# Patient Record
Sex: FEMALE | Race: WHITE | NOT HISPANIC OR LATINO | Employment: FULL TIME | URBAN - METROPOLITAN AREA
[De-identification: names, ages, dates, MRNs, and addresses within clinical notes are randomized per-mention and may not be internally consistent; named-entity substitution may affect disease eponyms.]

---

## 2017-05-25 ENCOUNTER — TELEPHONE (OUTPATIENT)
Dept: OBSTETRICS AND GYNECOLOGY | Facility: CLINIC | Age: 57
End: 2017-05-25

## 2017-05-25 DIAGNOSIS — N95.1 SYMPTOMATIC MENOPAUSAL OR FEMALE CLIMACTERIC STATES: Primary | ICD-10-CM

## 2017-05-25 RX ORDER — MEDROXYPROGESTERONE ACETATE 5 MG/1
5 TABLET ORAL DAILY
Qty: 30 TABLET | Refills: 3 | Status: SHIPPED | OUTPATIENT
Start: 2017-05-25 | End: 2017-06-23 | Stop reason: SDUPTHER

## 2017-05-25 NOTE — TELEPHONE ENCOUNTER
----- Message from Dyana Mathur sent at 5/24/2017 12:03 PM CDT -----  Contact: pt   Pt need refill on hormones pill.     ..[phone] (home) [phone] (work)      ..  [facility]/pharmacy #[id number] - [place] - [address] [address]  Phone: [phone] Fax: [phone]

## 2017-06-22 ENCOUNTER — TELEPHONE (OUTPATIENT)
Dept: OBSTETRICS AND GYNECOLOGY | Facility: CLINIC | Age: 57
End: 2017-06-22

## 2017-06-22 NOTE — TELEPHONE ENCOUNTER
----- Message from Ruby Montenegro sent at 6/22/2017  9:09 AM CDT -----  Contact: [facility]  She is checking the status on the refill on Evamist.  Call her at [phone].                                             collins

## 2017-06-23 ENCOUNTER — TELEPHONE (OUTPATIENT)
Dept: OBSTETRICS AND GYNECOLOGY | Facility: CLINIC | Age: 57
End: 2017-06-23

## 2017-06-23 DIAGNOSIS — N95.1 SYMPTOMATIC MENOPAUSAL OR FEMALE CLIMACTERIC STATES: Primary | ICD-10-CM

## 2017-06-23 RX ORDER — MEDROXYPROGESTERONE ACETATE 5 MG/1
5 TABLET ORAL DAILY
Qty: 30 TABLET | Refills: 0 | Status: SHIPPED | OUTPATIENT
Start: 2017-06-23 | End: 2017-07-23

## 2017-07-05 ENCOUNTER — OFFICE VISIT (OUTPATIENT)
Dept: OBSTETRICS AND GYNECOLOGY | Facility: CLINIC | Age: 57
End: 2017-07-05
Payer: COMMERCIAL

## 2017-07-05 VITALS
HEIGHT: 61 IN | BODY MASS INDEX: 39.84 KG/M2 | DIASTOLIC BLOOD PRESSURE: 84 MMHG | SYSTOLIC BLOOD PRESSURE: 116 MMHG | WEIGHT: 211 LBS

## 2017-07-05 DIAGNOSIS — Z12.4 SCREENING FOR CERVICAL CANCER: ICD-10-CM

## 2017-07-05 DIAGNOSIS — Z12.31 SCREENING MAMMOGRAM, ENCOUNTER FOR: ICD-10-CM

## 2017-07-05 DIAGNOSIS — Z01.419 ENCOUNTER FOR GYNECOLOGICAL EXAMINATION (GENERAL) (ROUTINE) WITHOUT ABNORMAL FINDINGS: Primary | ICD-10-CM

## 2017-07-05 DIAGNOSIS — N95.1 SYMPTOMATIC MENOPAUSAL OR FEMALE CLIMACTERIC STATES: ICD-10-CM

## 2017-07-05 PROCEDURE — 99999 PR PBB SHADOW E&M-EST. PATIENT-LVL III: CPT | Mod: PBBFAC,,, | Performed by: OBSTETRICS & GYNECOLOGY

## 2017-07-05 PROCEDURE — 99396 PREV VISIT EST AGE 40-64: CPT | Mod: S$GLB,,, | Performed by: OBSTETRICS & GYNECOLOGY

## 2017-07-05 PROCEDURE — 88175 CYTOPATH C/V AUTO FLUID REDO: CPT

## 2017-07-05 RX ORDER — MEDROXYPROGESTERONE ACETATE 5 MG/1
5 TABLET ORAL DAILY
Qty: 30 TABLET | Refills: 11 | Status: SHIPPED | OUTPATIENT
Start: 2017-07-05 | End: 2017-08-04

## 2017-07-05 NOTE — PROGRESS NOTES
Subjective:       Patient ID: Yolande Scherer is a 57 y.o. female.    Chief Complaint:  Well Woman      History of Present Illness  HPI  Annual Exam-Postmenopausal  Patient presents for annual exam. The patient has no complaints today. The patient is sexually active--but  with ED. GYN screening history: last pap: approximate date 2016 and was abnormal: +hpv and last mammogram: approximate date 2015 and was normal. The patient is taking hormone replacement therapy. Patient denies post-menopausal vaginal bleeding. The patient wears seatbelts: yes. The patient participates in regular exercise: no. Has the patient ever been transfused or tattooed?: no. The patient reports that there is not domestic violence in her life.      Denies hot flushes, night sweats; sleeping well--wishes to continue hrt (evamist spray and provera)  Reports occasional leak of urine with strong cough/sneeze           GYN & OB HistoryNo LMP recorded. Patient is postmenopausal.   Date of Last Pap: No result found    OB History   No data available       Review of Systems  Review of Systems   Constitutional: Negative for activity change, appetite change, chills, diaphoresis, fatigue, fever and unexpected weight change.   HENT: Negative for mouth sores and tinnitus.    Eyes: Negative for discharge and visual disturbance.   Respiratory: Negative for cough, shortness of breath and wheezing.    Cardiovascular: Negative for chest pain, palpitations and leg swelling.   Gastrointestinal: Negative for abdominal pain, bloating, blood in stool, constipation, diarrhea, nausea and vomiting.   Endocrine: Negative for diabetes, hair loss, hot flashes, hyperthyroidism and hypothyroidism.   Genitourinary: Negative for decreased libido, dyspareunia, dysuria, flank pain, frequency, genital sores, hematuria, menorrhagia, menstrual problem, pelvic pain, urgency, vaginal bleeding, vaginal discharge, vaginal pain, dysmenorrhea, urinary incontinence,  postcoital bleeding, postmenopausal bleeding and vaginal odor.   Musculoskeletal: Negative for back pain and myalgias.   Skin:  Negative for rash, no acne and hair changes.   Neurological: Negative for seizures, syncope, numbness and headaches.   Hematological: Negative for adenopathy. Does not bruise/bleed easily.   Psychiatric/Behavioral: Negative for depression and sleep disturbance. The patient is not nervous/anxious.    Breast: Negative for breast mass, breast pain, nipple discharge and skin changes          Objective:    Physical Exam:   Constitutional: She appears well-developed.     Eyes: Conjunctivae and EOM are normal. Pupils are equal, round, and reactive to light.    Neck: Normal range of motion. Neck supple.     Pulmonary/Chest: Effort normal. Right breast exhibits no mass, no nipple discharge, no skin change and no tenderness. Left breast exhibits no mass, no nipple discharge, no skin change and no tenderness. Breasts are symmetrical.        Abdominal: Soft.     Genitourinary: Rectum normal, vagina normal and uterus normal. Pelvic exam was performed with patient supine. Cervix is normal. Right adnexum displays no mass and no tenderness. Left adnexum displays no mass and no tenderness. No erythema, bleeding, rectocele, cystocele or unspecified prolapse of vaginal walls in the vagina. No vaginal discharge found. Labial bartholins normal.       Uterus Size: 6 cm   Musculoskeletal: Normal range of motion.       Neurological: She is alert.    Skin: Skin is warm.    Psychiatric: She has a normal mood and affect.          Assessment:        1. Encounter for gynecological examination (general) (routine) without abnormal findings    2. Screening for cervical cancer    3. Screening mammogram, encounter for               Plan:        Continue well woman  rx sent for evamist /provera  mammo ordered  Continue yearly pap due to hx +hpv  Current on colonoscopy  Continue diet, exercise, weight loss

## 2017-12-06 ENCOUNTER — OFFICE VISIT (OUTPATIENT)
Dept: OBSTETRICS AND GYNECOLOGY | Facility: CLINIC | Age: 57
End: 2017-12-06
Payer: COMMERCIAL

## 2017-12-06 VITALS
BODY MASS INDEX: 39.95 KG/M2 | WEIGHT: 211.63 LBS | DIASTOLIC BLOOD PRESSURE: 64 MMHG | SYSTOLIC BLOOD PRESSURE: 114 MMHG | HEIGHT: 61 IN

## 2017-12-06 DIAGNOSIS — N95.0 PMB (POSTMENOPAUSAL BLEEDING): Primary | ICD-10-CM

## 2017-12-06 PROCEDURE — 88305 TISSUE EXAM BY PATHOLOGIST: CPT | Performed by: PATHOLOGY

## 2017-12-06 PROCEDURE — 99999 PR PBB SHADOW E&M-EST. PATIENT-LVL III: CPT | Mod: PBBFAC,,, | Performed by: OBSTETRICS & GYNECOLOGY

## 2017-12-06 PROCEDURE — 88305 TISSUE EXAM BY PATHOLOGIST: CPT | Mod: 26,,, | Performed by: PATHOLOGY

## 2017-12-06 PROCEDURE — 58100 BIOPSY OF UTERUS LINING: CPT | Mod: S$GLB,,, | Performed by: OBSTETRICS & GYNECOLOGY

## 2017-12-06 PROCEDURE — 99499 UNLISTED E&M SERVICE: CPT | Mod: S$GLB,,, | Performed by: OBSTETRICS & GYNECOLOGY

## 2017-12-06 RX ORDER — MEDROXYPROGESTERONE ACETATE 5 MG/1
5 TABLET ORAL DAILY
Refills: 11 | COMMUNITY
Start: 2017-09-22 | End: 2018-04-05 | Stop reason: SDUPTHER

## 2017-12-06 RX ORDER — CYCLOBENZAPRINE HCL 10 MG
10 TABLET ORAL
COMMUNITY
Start: 2017-10-19 | End: 2018-07-26

## 2017-12-06 RX ORDER — TRAMADOL HYDROCHLORIDE 50 MG/1
50 TABLET ORAL
COMMUNITY
Start: 2017-10-19 | End: 2020-04-07 | Stop reason: ALTCHOICE

## 2017-12-06 RX ORDER — ESTRADIOL 1.53 MG/1
SPRAY TRANSDERMAL
Refills: 11 | COMMUNITY
Start: 2017-11-11 | End: 2018-04-05 | Stop reason: ALTCHOICE

## 2017-12-06 NOTE — PROCEDURES
Procedures   CC: ENDOMETRIAL BIOPSPY    Yolande Scherer is a 57 y.o. female No obstetric history on file. presents for an endometrial biopsy secondary to post menopausal bleeding on hrt.  UPT was not done     PRE-ENDOMETRIAL BIOPSY COUNSELING:    The patient was informed of the risk of bleeding, infection, uterine perforation and pain and that the test will rule-out endometrial cancer with accuracy greater than 95%.  She was counseled on the alternatives to endometrial biopsy and agrees to proceed.      TIME OUT PERFORMED.    The cervix was visualized with a speculum.  A single tooth tenaculum was placed on the anterior lip prior to the biopsy.      A sterile endometrial pipelle was passed without difficulty to a depth of 9 cm.    Endometrial tissue was obtained.      The specimen was placed in formalyn and sent to Pathology of histology evaluation.    The patient tolerated the procedure well.      ASSESSMENT AND PLAN  1. PMB (postmenopausal bleeding)  Tissue Specimen To Pathology, Obstetrics/Gynecology       POST ENDOMETRIAL BIOPSY COUNSELING:  Manage post biopsy cramping with NSAIDs or Tylenol.  Expect spotting or light bleeding for a few days.  Report bleeding heavier than a period, fever > 101.0 F, worsening pain or a foul smelling vaginal discharge.      Counseling lasted approximately 15 minutes and all her questions were answered.      FOLLOW-UP:  Pending biopsy

## 2017-12-06 NOTE — PROGRESS NOTES
Subjective:       Patient ID: Yolande Scherer is a 57 y.o. female.    Chief Complaint:  Vaginal Bleeding (irregular vag bleeding x's 2 weeks)      History of Present Illness  HPI  Postmenopausal Bleeding  Patient complains of vaginal bleeding. She has been menopausal for several years. Currently on continuous HRT and has been on this regimen for several years (elsa mist --3 sprays and provera 5mg) . Bleeding is described as spotting and has occurred several times. Notices that underwear is stained and dark red brown color when she wipes; also c/o lower pelvic pain and vaginal soreness; no recent intercourse;   Denies any missed pills of provera  Menstrual History:  OB History     No data available         Menarche age:   No LMP recorded. Patient is postmenopausal.           GYN & OB HistoryNo LMP recorded. Patient is postmenopausal.   Date of Last Pap: 7/10/2017    OB History   No data available       Review of Systems  Review of Systems   Constitutional: Negative for activity change, appetite change, chills, diaphoresis, fatigue, fever and unexpected weight change.   HENT: Negative for mouth sores and tinnitus.    Eyes: Negative for discharge and visual disturbance.   Respiratory: Negative for cough, shortness of breath and wheezing.    Cardiovascular: Negative for chest pain, palpitations and leg swelling.   Gastrointestinal: Negative for abdominal pain, bloating, blood in stool, constipation, diarrhea, nausea and vomiting.   Endocrine: Negative for diabetes, hair loss, hot flashes, hyperthyroidism and hypothyroidism.   Genitourinary: Positive for postmenopausal bleeding. Negative for decreased libido, dyspareunia, dysuria, flank pain, frequency, genital sores, hematuria, menorrhagia, menstrual problem, pelvic pain, urgency, vaginal bleeding, vaginal discharge, vaginal pain, dysmenorrhea, urinary incontinence, postcoital bleeding and vaginal odor.   Musculoskeletal: Negative for back pain and myalgias.    Skin:  Negative for rash, no acne and hair changes.   Neurological: Negative for seizures, syncope, numbness and headaches.   Hematological: Negative for adenopathy. Does not bruise/bleed easily.   Psychiatric/Behavioral: Negative for depression and sleep disturbance. The patient is not nervous/anxious.    Breast: Negative for breast mass, breast pain, nipple discharge and skin changes          Objective:    Physical Exam:   Constitutional: She appears well-developed.     Eyes: Conjunctivae and EOM are normal. Pupils are equal, round, and reactive to light.    Neck: Normal range of motion. Neck supple.     Pulmonary/Chest: Effort normal. Right breast exhibits no mass, no nipple discharge, no skin change, no tenderness and presence. Left breast exhibits no mass, no nipple discharge, no skin change, no tenderness and presence. Breasts are symmetrical.        Abdominal: Soft.     Genitourinary: Vagina normal and uterus normal. Pelvic exam was performed with patient supine.           Musculoskeletal: Normal range of motion.       Neurological: She is alert.    Skin: Skin is warm.    Psychiatric: She has a normal mood and affect.          Assessment:     Encounter Diagnosis   Name Primary?    PMB (postmenopausal bleeding) Yes                 Plan:      Pt advised needs emb  Agrees to proceed  See procedure note for details

## 2017-12-11 ENCOUNTER — TELEPHONE (OUTPATIENT)
Dept: OBSTETRICS AND GYNECOLOGY | Facility: CLINIC | Age: 57
End: 2017-12-11

## 2017-12-11 NOTE — TELEPHONE ENCOUNTER
Left messages for the pt. to call back. benjamin flynn    Please advise the emb was negative--no sign of abnormal tissue (hyperplasia or cancer); continue to use  evamist and provera daily; please continue to watch for bleeding; if bleeding reoccurs, will need hysteroscopy (procedure where I look inside uterus--out pt procedure)

## 2017-12-12 ENCOUNTER — TELEPHONE (OUTPATIENT)
Dept: OBSTETRICS AND GYNECOLOGY | Facility: CLINIC | Age: 57
End: 2017-12-12

## 2017-12-12 DIAGNOSIS — N95.0 POST-MENOPAUSAL BLEEDING: Primary | ICD-10-CM

## 2017-12-12 NOTE — TELEPHONE ENCOUNTER
----- Message from Maeve Pantoja sent at 12/12/2017 12:14 PM CST -----  Contact: self   Patient returning call. Please call back at [phone] or [phone] please let know it is the doctors office when calling work phone.      Thanks,  Maeve Pantoja

## 2017-12-12 NOTE — TELEPHONE ENCOUNTER
Bleeding has not stopped and it still comes and goes. Pt wishes to proceed to next step if possible. DS

## 2017-12-12 NOTE — TELEPHONE ENCOUNTER
----- Message from Bridget Burgos sent at 12/12/2017  8:17 AM CST -----  Contact: Pt   Pt called and stated she needed to speak to the nurse. She stated that she is returning a call to the nurse regarding her biopsy results. She can be reached at [phone] (home) [phone] (work).    Thanks,  TF

## 2018-01-18 ENCOUNTER — TELEPHONE (OUTPATIENT)
Dept: OBSTETRICS AND GYNECOLOGY | Facility: CLINIC | Age: 58
End: 2018-01-18

## 2018-01-18 NOTE — TELEPHONE ENCOUNTER
----- Message from Noemi Collins sent at 1/18/2018  2:36 PM CST -----  Contact: pt   x 1st Request  _ 2nd Request  _ 3rd Request    Who: Pt     Why: Pt is calling to reschedule Pre-op appt. First available was in March. Please call and advise.     What Number to Call Back:[phone]     When to Expect a call back: (Before the end of the day)  -- if call after 3:00 call back will be tomorrow.

## 2018-01-19 ENCOUNTER — TELEPHONE (OUTPATIENT)
Dept: OBSTETRICS AND GYNECOLOGY | Facility: CLINIC | Age: 58
End: 2018-01-19

## 2018-01-19 NOTE — TELEPHONE ENCOUNTER
"Pt has a liability and/or residual balance due.            Is this procedure medically urgent or non-medically?   Please respond via In-basket or contact [facility] @ [phone]                   Medical Urgency Definition( below)   If you can answer Yes to one of the following questions, the    Case will be considered "Medically Urgent" and will be done as   Scheduled irrespective of whether [facility] will receive payment.      *If this particular case is not done as scheduled, would the patient   Experience loss of life?   *Loss of Limb?   *Irreversible loss of function?   *Would their condition significantly worsen?         If none of these questions have a yes answer, the case   Should be postponed until such a time as the finances   can be sorted out.       Thanks April       please advise .....noe   "

## 2018-01-19 NOTE — TELEPHONE ENCOUNTER
"Tried calling the financial counselor several times to inform them that the pt.'s procedure is not medically necessary per Dr. Ward. benjamin Paulino    Pt has a liability and/or residual balance due.            Is this procedure medically urgent or non-medically?   Please respond via In-basket or contact [facility] @ [phone]                     Medical Urgency Definition( below)   If you can answer Yes to one of the following questions, the    Case will be considered "Medically Urgent" and will be done as   Scheduled irrespective of whether [facility] will receive payment.       *If this particular case is not done as scheduled, would the patient   Experience loss of life?   *Loss of Limb?   *Irreversible loss of function?   *Would their condition significantly worsen?           If none of these questions have a yes answer, the case   Should be postponed until such a time as the finances   can be sorted out.        Thanks April        "

## 2018-01-22 ENCOUNTER — LAB VISIT (OUTPATIENT)
Dept: LAB | Facility: HOSPITAL | Age: 58
End: 2018-01-22
Attending: OBSTETRICS & GYNECOLOGY
Payer: COMMERCIAL

## 2018-01-22 ENCOUNTER — OFFICE VISIT (OUTPATIENT)
Dept: OBSTETRICS AND GYNECOLOGY | Facility: CLINIC | Age: 58
End: 2018-01-22
Payer: COMMERCIAL

## 2018-01-22 VITALS
BODY MASS INDEX: 38.75 KG/M2 | HEIGHT: 61 IN | SYSTOLIC BLOOD PRESSURE: 118 MMHG | WEIGHT: 205.25 LBS | DIASTOLIC BLOOD PRESSURE: 74 MMHG

## 2018-01-22 DIAGNOSIS — N95.0 POST-MENOPAUSAL BLEEDING: Primary | ICD-10-CM

## 2018-01-22 DIAGNOSIS — N95.0 POST-MENOPAUSAL BLEEDING: ICD-10-CM

## 2018-01-22 LAB
BASOPHILS # BLD AUTO: 0.03 K/UL
BASOPHILS NFR BLD: 0.4 %
DIFFERENTIAL METHOD: NORMAL
EOSINOPHIL # BLD AUTO: 0.1 K/UL
EOSINOPHIL NFR BLD: 1.5 %
ERYTHROCYTE [DISTWIDTH] IN BLOOD BY AUTOMATED COUNT: 14 %
HCT VFR BLD AUTO: 43.9 %
HGB BLD-MCNC: 14.1 G/DL
IMM GRANULOCYTES # BLD AUTO: 0.03 K/UL
IMM GRANULOCYTES NFR BLD AUTO: 0.4 %
LYMPHOCYTES # BLD AUTO: 2.2 K/UL
LYMPHOCYTES NFR BLD: 26.9 %
MCH RBC QN AUTO: 29 PG
MCHC RBC AUTO-ENTMCNC: 32.1 G/DL
MCV RBC AUTO: 90 FL
MONOCYTES # BLD AUTO: 0.4 K/UL
MONOCYTES NFR BLD: 4.7 %
NEUTROPHILS # BLD AUTO: 5.4 K/UL
NEUTROPHILS NFR BLD: 66.1 %
NRBC BLD-RTO: 0 /100 WBC
PLATELET # BLD AUTO: 295 K/UL
PMV BLD AUTO: 10.4 FL
RBC # BLD AUTO: 4.86 M/UL
WBC # BLD AUTO: 8.15 K/UL

## 2018-01-22 PROCEDURE — 99499 UNLISTED E&M SERVICE: CPT | Mod: S$GLB,,, | Performed by: OBSTETRICS & GYNECOLOGY

## 2018-01-22 PROCEDURE — 85025 COMPLETE CBC W/AUTO DIFF WBC: CPT

## 2018-01-22 PROCEDURE — 99999 PR PBB SHADOW E&M-EST. PATIENT-LVL III: CPT | Mod: PBBFAC,,, | Performed by: OBSTETRICS & GYNECOLOGY

## 2018-01-22 PROCEDURE — 80048 BASIC METABOLIC PNL TOTAL CA: CPT

## 2018-01-22 PROCEDURE — 36415 COLL VENOUS BLD VENIPUNCTURE: CPT | Mod: PO

## 2018-01-22 PROCEDURE — 84702 CHORIONIC GONADOTROPIN TEST: CPT

## 2018-01-22 NOTE — PRE-PROCEDURE INSTRUCTIONS
Pre op instructions reviewed with patient per phone:    To confirm, Your surgeon has instructed you:  Surgery is scheduled 1/25/18 at 1245.      Please report to [facility] 1st floor main lobby by 1115  Pre admit office will call afternoon prior to surgery for final arrival time.      INSTRUCTIONS IMPORTANT!!!  ¨ No smoking after 12 midnight, the night before surgery.  ¨ No solid food after 12 midnight, but you may have clear liquids up until 3 hours prior to surgery.  This includes: grape, cranberry, and apple juice (not orange, and no coffee.)   ¨ OK to brush teeth, but no gum, candy or mints!    ¨ Take only these medicines with a small swallow of water-morning of surgery.  Omeprazole, Sodium Bicarb    ____  Do not wear makeup, including mascara.  ____  No powder, lotions or creams to surgical area.  ____  Please remove all jewelry, including piercings and leave at home.  ____  No money or valuables needed. Please leave at home.  ____  Please bring identification and insurance information to hospital.  ____  If going home the same day, arrange for a ride home. You will not be able to   drive if Anesthesia was used.  ____  Children, under 12 years old, must remain in the waiting room with an adult.  They are not allowed in patient areas.  ____  Wear loose fitting clothing. Allow for dressings, bandages.  ____  Stop Aspirin, Ibuprofen, Motrin and Aleve at least 5-7 days before surgery, unless otherwise instructed by your doctor, or the nurse.   You MAY use Tylenol/acetaminophen until day of surgery.  ____  If you take diabetic medication, do not take am of surgery unless instructed by   Doctor.  ____ Stop taking any Fish Oil supplement or any Vitamins that contain Vitamin E at least 5 days prior to surgery.          Bathing Instructions-- The night before surgery and the morning prior to coming to the hospital:   -Do not shave the surgical area.   -Shower and wash your hair and body as usual  with your regular soap and shampoo.   -Rinse your hair and body completely.   -Use one packet of hibiclens to wash the surgical site (using your hand) gently for 5 minutes.  Do not scrub you skin too hard.   -Do not use hibiclens on your head, face, or genitals.   -Do not wash with regular soap after you use the hibiclens.   -Rinse your body thoroughly.   -Dry with clean, soft towel.  Do not use lotion, cream, deodorant, or powders on   the surgical site.    Use antibacterial soap in place of hibiclens if your surgery is on the head, face or genitals.         Surgical Site Infection    Prevention of surgical site infections:     -Keep incisions clean and dry.   -Do not soak/submerge incisions in water until completely healed.   -Do not apply lotions, powders, creams, or deodorants to site.   -Always make sure hands are cleaned with antibacterial soap/ alcohol-based   prior to touching the surgical site.  (This includes doctors, nurses, staff, and yourself.)    Signs and symptoms:   -Redness and pain around the area where you had surgery   -Drainage of cloudy fluid from your surgical wound   -Fever over 100.4  I have read or had read and explained to me, and understand the above information.

## 2018-01-22 NOTE — PROGRESS NOTES
History & Physical    SUBJECTIVE:     History of Present Illness:  Patient is a 57 y.o. female presents with post menopausal bleeding; using usual dose of evamist spray (3 sprays) with daily provera; emb 12/2017--shows benign inactive endometrium with no signs of hyperplasia.  Onset of symptoms was 12/2017; has not had further bleeding since late December;  Patient denies uterine cramping. Bleeding is unpredictable    Chief Complaint   Patient presents with    Pre-op Exam       Review of patient's allergies indicates:  No Known Allergies    Current Outpatient Prescriptions   Medication Sig Dispense Refill    cholecalciferol, vitamin D3, 1,000 unit capsule Take 1,000 Units by mouth once daily.      EVAMIST 1.53 mg/spray (1.7%) transdermal spray PLACE 3 SPRAYS ONTO THE SKIN ONCE DAILY.  11    medroxyPROGESTERone (PROVERA) 5 MG tablet Take 5 mg by mouth once daily.  11    omeprazole (PRILOSEC) 40 MG capsule       omeprazole-sodium bicarbonate (ZEGERID) 40-1.1 mg-gram per capsule       cyclobenzaprine (FLEXERIL) 10 MG tablet Take 10 mg by mouth.      traMADol (ULTRAM) 50 mg tablet Take 50 mg by mouth.       No current facility-administered medications for this visit.        Past Medical History:   Diagnosis Date    Dry eyes 12/9/2013    Dry mouth 1/17/2014    Fibromyalgia 12/9/2013    History of vitamin D deficiency     Liver cyst     NAFLD (nonalcoholic fatty liver disease)      Past Surgical History:   Procedure Laterality Date    CHOLECYSTECTOMY      COLONOSCOPY      TONSILLECTOMY      WISDOM TOOTH EXTRACTION       Family History   Problem Relation Age of Onset    Cataracts Mother     Macular degeneration Father     Cataracts Father     Stroke Father     Liver disease Neg Hx      Social History   Substance Use Topics    Smoking status: Never Smoker    Smokeless tobacco: Never Used    Alcohol use No        Review of Systems:  Review of Systems   Genitourinary: Positive for menstrual problem.  "      OBJECTIVE:     Vital Signs (Most Recent)  BP: 118/74 (01/22/18 0948)  5' 1" (1.549 m)  93.1 kg (205 lb 4 oz)     Physical Exam:  Physical Exam   Constitutional: She is oriented to person, place, and time. She appears well-developed.   HENT:   Head: Normocephalic.   Eyes: Conjunctivae and EOM are normal. Pupils are equal, round, and reactive to light.   Neck: Normal range of motion.   Cardiovascular: Normal rate.    Pulmonary/Chest: Effort normal and breath sounds normal.   Abdominal: Soft.   Musculoskeletal: Normal range of motion.   Neurological: She is alert and oriented to person, place, and time.   Skin: Skin is warm and dry.   Psychiatric: She has a normal mood and affect. Her behavior is normal. Judgment and thought content normal.   Vitals reviewed.      Laboratory  Cbc,cmp, hcg today    Diagnostic Results:  n/a    ASSESSMENT/PLAN:     Encounter Diagnosis   Name Primary?    Post-menopausal bleeding Yes         PLAN:Plan     Reviewed dilation and curettage with hysteroscopy and  novasure ablation procedure in detail.  Reviewed risks including but not limited to infection, bleeding, damage to bowel/bladder, cva;htn.     All questions answered to the best of my ability.  Alternatives reviewed --continued observation, increase dose of provera; stop hrt  Pt wishes to proceed with dilation and curettage and hysteroscopy with ablation.  Consents signed witnessed.  Pt aware if continues to have bleeding after novasure; may need hysterectomy           "

## 2018-01-23 LAB
ANION GAP SERPL CALC-SCNC: 10 MMOL/L
BUN SERPL-MCNC: 11 MG/DL
CALCIUM SERPL-MCNC: 9.2 MG/DL
CHLORIDE SERPL-SCNC: 107 MMOL/L
CO2 SERPL-SCNC: 24 MMOL/L
CREAT SERPL-MCNC: 0.7 MG/DL
EST. GFR  (AFRICAN AMERICAN): >60 ML/MIN/1.73 M^2
EST. GFR  (NON AFRICAN AMERICAN): >60 ML/MIN/1.73 M^2
GLUCOSE SERPL-MCNC: 73 MG/DL
HCG INTACT+B SERPL-ACNC: <1.2 MIU/ML
POTASSIUM SERPL-SCNC: 4 MMOL/L
SODIUM SERPL-SCNC: 141 MMOL/L

## 2018-01-24 ENCOUNTER — ANESTHESIA EVENT (OUTPATIENT)
Dept: SURGERY | Facility: HOSPITAL | Age: 58
End: 2018-01-24
Payer: COMMERCIAL

## 2018-01-25 ENCOUNTER — SURGERY (OUTPATIENT)
Age: 58
End: 2018-01-25

## 2018-01-25 ENCOUNTER — HOSPITAL ENCOUNTER (OUTPATIENT)
Facility: HOSPITAL | Age: 58
Discharge: HOME | End: 2018-01-25
Attending: OBSTETRICS & GYNECOLOGY | Admitting: OBSTETRICS & GYNECOLOGY
Payer: COMMERCIAL

## 2018-01-25 ENCOUNTER — ANESTHESIA (OUTPATIENT)
Dept: SURGERY | Facility: HOSPITAL | Age: 58
End: 2018-01-25
Payer: COMMERCIAL

## 2018-01-25 ENCOUNTER — TELEPHONE (OUTPATIENT)
Dept: OBSTETRICS AND GYNECOLOGY | Facility: CLINIC | Age: 58
End: 2018-01-25

## 2018-01-25 DIAGNOSIS — N95.0 POST-MENOPAUSAL BLEEDING: ICD-10-CM

## 2018-01-25 PROCEDURE — 88305 TISSUE EXAM BY PATHOLOGIST: CPT | Performed by: PATHOLOGY

## 2018-01-25 PROCEDURE — 71000015 HC POSTOP RECOV 1ST HR: Performed by: OBSTETRICS & GYNECOLOGY

## 2018-01-25 PROCEDURE — 63600175 PHARM REV CODE 636 W HCPCS: Performed by: CLINIC/CENTER

## 2018-01-25 PROCEDURE — 25000003 PHARM REV CODE 250: Performed by: ANESTHESIOLOGY

## 2018-01-25 PROCEDURE — 27201423 OPTIME MED/SURG SUP & DEVICES STERILE SUPPLY: Performed by: OBSTETRICS & GYNECOLOGY

## 2018-01-25 PROCEDURE — 88305 TISSUE EXAM BY PATHOLOGIST: CPT | Mod: 26,,, | Performed by: PATHOLOGY

## 2018-01-25 PROCEDURE — 36000707: Performed by: OBSTETRICS & GYNECOLOGY

## 2018-01-25 PROCEDURE — 63600175 PHARM REV CODE 636 W HCPCS: Performed by: ANESTHESIOLOGY

## 2018-01-25 PROCEDURE — 37000009 HC ANESTHESIA EA ADD 15 MINS: Performed by: OBSTETRICS & GYNECOLOGY

## 2018-01-25 PROCEDURE — 37000008 HC ANESTHESIA 1ST 15 MINUTES: Performed by: OBSTETRICS & GYNECOLOGY

## 2018-01-25 PROCEDURE — 71000039 HC RECOVERY, EACH ADD'L HOUR: Performed by: OBSTETRICS & GYNECOLOGY

## 2018-01-25 PROCEDURE — 36000706: Performed by: OBSTETRICS & GYNECOLOGY

## 2018-01-25 PROCEDURE — 71000033 HC RECOVERY, INTIAL HOUR: Performed by: OBSTETRICS & GYNECOLOGY

## 2018-01-25 PROCEDURE — 25000003 PHARM REV CODE 250: Performed by: CLINIC/CENTER

## 2018-01-25 PROCEDURE — 58563 HYSTEROSCOPY ABLATION: CPT | Mod: ,,, | Performed by: OBSTETRICS & GYNECOLOGY

## 2018-01-25 RX ORDER — IBUPROFEN 800 MG/1
800 TABLET ORAL 3 TIMES DAILY
Qty: 15 TABLET | Refills: 0 | Status: SHIPPED | OUTPATIENT
Start: 2018-01-25 | End: 2018-01-30

## 2018-01-25 RX ORDER — DIPHENHYDRAMINE HYDROCHLORIDE 50 MG/ML
25 INJECTION INTRAMUSCULAR; INTRAVENOUS EVERY 4 HOURS PRN
Status: DISCONTINUED | OUTPATIENT
Start: 2018-01-25 | End: 2018-01-25 | Stop reason: HOSPADM

## 2018-01-25 RX ORDER — ROCURONIUM BROMIDE 10 MG/ML
INJECTION, SOLUTION INTRAVENOUS
Status: DISCONTINUED | OUTPATIENT
Start: 2018-01-25 | End: 2018-01-25

## 2018-01-25 RX ORDER — SUCCINYLCHOLINE CHLORIDE 20 MG/ML
INJECTION INTRAMUSCULAR; INTRAVENOUS
Status: DISCONTINUED | OUTPATIENT
Start: 2018-01-25 | End: 2018-01-25

## 2018-01-25 RX ORDER — FENTANYL CITRATE 50 UG/ML
25 INJECTION, SOLUTION INTRAMUSCULAR; INTRAVENOUS EVERY 5 MIN PRN
Status: DISCONTINUED | OUTPATIENT
Start: 2018-01-25 | End: 2018-01-25 | Stop reason: HOSPADM

## 2018-01-25 RX ORDER — ONDANSETRON 8 MG/1
8 TABLET, ORALLY DISINTEGRATING ORAL EVERY 8 HOURS PRN
Status: DISCONTINUED | OUTPATIENT
Start: 2018-01-25 | End: 2018-01-25 | Stop reason: HOSPADM

## 2018-01-25 RX ORDER — MEPERIDINE HYDROCHLORIDE 50 MG/ML
12.5 INJECTION INTRAMUSCULAR; INTRAVENOUS; SUBCUTANEOUS ONCE AS NEEDED
Status: COMPLETED | OUTPATIENT
Start: 2018-01-25 | End: 2018-01-25

## 2018-01-25 RX ORDER — SODIUM CHLORIDE 0.9 % (FLUSH) 0.9 %
3 SYRINGE (ML) INJECTION
Status: DISCONTINUED | OUTPATIENT
Start: 2018-01-25 | End: 2018-01-25 | Stop reason: HOSPADM

## 2018-01-25 RX ORDER — PROPOFOL 10 MG/ML
VIAL (ML) INTRAVENOUS
Status: DISCONTINUED | OUTPATIENT
Start: 2018-01-25 | End: 2018-01-25

## 2018-01-25 RX ORDER — SODIUM CHLORIDE 0.9 % (FLUSH) 0.9 %
3 SYRINGE (ML) INJECTION EVERY 8 HOURS
Status: DISCONTINUED | OUTPATIENT
Start: 2018-01-25 | End: 2018-01-25 | Stop reason: HOSPADM

## 2018-01-25 RX ORDER — HYDROCODONE BITARTRATE AND ACETAMINOPHEN 5; 325 MG/1; MG/1
1 TABLET ORAL EVERY 4 HOURS PRN
Status: DISCONTINUED | OUTPATIENT
Start: 2018-01-25 | End: 2018-01-25 | Stop reason: HOSPADM

## 2018-01-25 RX ORDER — LIDOCAINE HYDROCHLORIDE 10 MG/ML
INJECTION INFILTRATION; PERINEURAL
Status: DISCONTINUED | OUTPATIENT
Start: 2018-01-25 | End: 2018-01-25

## 2018-01-25 RX ORDER — ONDANSETRON 2 MG/ML
INJECTION INTRAMUSCULAR; INTRAVENOUS
Status: DISCONTINUED | OUTPATIENT
Start: 2018-01-25 | End: 2018-01-25

## 2018-01-25 RX ORDER — DIPHENHYDRAMINE HCL 25 MG
25 CAPSULE ORAL EVERY 4 HOURS PRN
Status: DISCONTINUED | OUTPATIENT
Start: 2018-01-25 | End: 2018-01-25 | Stop reason: HOSPADM

## 2018-01-25 RX ORDER — FENTANYL CITRATE 50 UG/ML
INJECTION, SOLUTION INTRAMUSCULAR; INTRAVENOUS
Status: DISCONTINUED | OUTPATIENT
Start: 2018-01-25 | End: 2018-01-25

## 2018-01-25 RX ORDER — METOCLOPRAMIDE HYDROCHLORIDE 5 MG/ML
10 INJECTION INTRAMUSCULAR; INTRAVENOUS EVERY 10 MIN PRN
Status: DISCONTINUED | OUTPATIENT
Start: 2018-01-25 | End: 2018-01-25 | Stop reason: HOSPADM

## 2018-01-25 RX ORDER — HYDROCODONE BITARTRATE AND ACETAMINOPHEN 5; 325 MG/1; MG/1
1 TABLET ORAL EVERY 6 HOURS PRN
Qty: 8 TABLET | Refills: 0 | Status: SHIPPED | OUTPATIENT
Start: 2018-01-25 | End: 2018-04-05 | Stop reason: ALTCHOICE

## 2018-01-25 RX ORDER — OXYCODONE HYDROCHLORIDE 5 MG/1
5 TABLET ORAL
Status: DISCONTINUED | OUTPATIENT
Start: 2018-01-25 | End: 2018-01-25 | Stop reason: HOSPADM

## 2018-01-25 RX ORDER — MIDAZOLAM HYDROCHLORIDE 1 MG/ML
INJECTION, SOLUTION INTRAMUSCULAR; INTRAVENOUS
Status: DISCONTINUED | OUTPATIENT
Start: 2018-01-25 | End: 2018-01-25

## 2018-01-25 RX ORDER — SODIUM CHLORIDE, SODIUM LACTATE, POTASSIUM CHLORIDE, CALCIUM CHLORIDE 600; 310; 30; 20 MG/100ML; MG/100ML; MG/100ML; MG/100ML
INJECTION, SOLUTION INTRAVENOUS CONTINUOUS
Status: DISCONTINUED | OUTPATIENT
Start: 2018-01-25 | End: 2018-01-25 | Stop reason: HOSPADM

## 2018-01-25 RX ORDER — OXYCODONE HYDROCHLORIDE 5 MG/1
5 TABLET ORAL ONCE AS NEEDED
Status: COMPLETED | OUTPATIENT
Start: 2018-01-25 | End: 2018-01-25

## 2018-01-25 RX ORDER — GLYCOPYRROLATE 0.2 MG/ML
INJECTION INTRAMUSCULAR; INTRAVENOUS
Status: DISCONTINUED | OUTPATIENT
Start: 2018-01-25 | End: 2018-01-25

## 2018-01-25 RX ORDER — ACETAMINOPHEN 10 MG/ML
1000 INJECTION, SOLUTION INTRAVENOUS ONCE
Status: COMPLETED | OUTPATIENT
Start: 2018-01-25 | End: 2018-01-25

## 2018-01-25 RX ADMIN — FENTANYL CITRATE 100 MCG: 50 INJECTION, SOLUTION INTRAMUSCULAR; INTRAVENOUS at 07:01

## 2018-01-25 RX ADMIN — SODIUM CHLORIDE, SODIUM LACTATE, POTASSIUM CHLORIDE, AND CALCIUM CHLORIDE: 600; 310; 30; 20 INJECTION, SOLUTION INTRAVENOUS at 06:01

## 2018-01-25 RX ADMIN — PROPOFOL 150 MG: 10 INJECTION, EMULSION INTRAVENOUS at 07:01

## 2018-01-25 RX ADMIN — LIDOCAINE HYDROCHLORIDE 60 MG: 10 INJECTION, SOLUTION INFILTRATION; PERINEURAL at 07:01

## 2018-01-25 RX ADMIN — MIDAZOLAM HYDROCHLORIDE 2 MG: 1 INJECTION, SOLUTION INTRAMUSCULAR; INTRAVENOUS at 06:01

## 2018-01-25 RX ADMIN — FENTANYL CITRATE 25 MCG: 50 INJECTION, SOLUTION INTRAMUSCULAR; INTRAVENOUS at 09:01

## 2018-01-25 RX ADMIN — OXYCODONE HYDROCHLORIDE 5 MG: 5 TABLET ORAL at 09:01

## 2018-01-25 RX ADMIN — ROBINUL 0.2 MG: 0.2 INJECTION INTRAMUSCULAR; INTRAVENOUS at 07:01

## 2018-01-25 RX ADMIN — ONDANSETRON 4 MG: 2 INJECTION, SOLUTION INTRAMUSCULAR; INTRAVENOUS at 07:01

## 2018-01-25 RX ADMIN — OXYCODONE HYDROCHLORIDE 5 MG: 5 TABLET ORAL at 08:01

## 2018-01-25 RX ADMIN — MEPERIDINE HYDROCHLORIDE 12.5 MG: 50 INJECTION INTRAMUSCULAR; INTRAVENOUS; SUBCUTANEOUS at 08:01

## 2018-01-25 RX ADMIN — ROCURONIUM BROMIDE 5 MG: 10 INJECTION, SOLUTION INTRAVENOUS at 07:01

## 2018-01-25 RX ADMIN — ACETAMINOPHEN 1000 MG: 10 INJECTION, SOLUTION INTRAVENOUS at 08:01

## 2018-01-25 RX ADMIN — SUCCINYLCHOLINE CHLORIDE 100 MG: 20 INJECTION, SOLUTION INTRAMUSCULAR; INTRAVENOUS at 07:01

## 2018-01-25 NOTE — TELEPHONE ENCOUNTER
----- Message from Windy Arriaga sent at 1/25/2018  8:36 AM CST -----  Contact: Discharge nurse  She's calling to see if the pt can be fit into schedule for [facility], please advise [phone] (home) [phone] (work)

## 2018-01-25 NOTE — DISCHARGE INSTRUCTIONS
General Information:    1. Do not drink alcoholic beverages including beer for 24 hours or as long as you are on pain medication..  2. Do not drive a motor vehicle, operate machinery or power tools, or signs legal papers for 24 hours or as long as you are on pain medication.   3. You may experience light-headedness, dizziness, and sleepiness following surgery. Please do not stay alone. A responsible adult should be with you for this 24 hour period.  4. Go home and rest.  5. Progress slowly to a normal diet unless instructed.  Otherwise, begin with liquids such as soft drinks, then soup and crackers working up to solid foods. Drink plenty of nonalcoholic fluids.  6. Certain anesthetics and pain medications produce nausea and vomiting in certain individuals. If nausea becomes a problem at home, call you doctor.  7. A nurse will be calling you sometime after surgery. Do not be alarmed. This is our way of finding out how you are doing.  8. Several times every hour while you are awake, take 2-3 deep breaths and cough. If you had stomach surgery hold a pillow or rolled towel firmly against your stomach before you cough. This will help with any pain the cough might cause.  9. Several times every hour while you are awake, pump and flex your feet 5-6 times and do foot circles. This will help prevent blood clots.  10. Call your doctor for severe pain, bleeding, fever, or signs or symptoms of infection (pain, swelling, redness, foul odor, drainage).  11. You can contact your doctor anytime by [phone] for the [facility] (at [facility]) or [phone] for the [facility] on [facility].   [email or web address] is another way to contact your doctor if you are an active participant online with [facility].  12. Continue Nozin provided at discharge twice daily for 7 days or until the incision is healed.  See pamphlet or box for instructions.

## 2018-01-25 NOTE — INTERVAL H&P NOTE
The patient has been examined and the H&P has been reviewed:    I concur with the findings and no changes have occurred since H&P was written.--she is ready to proceed with hysteroscopy with d&c and novasure ablation; She has no further questions    Anesthesia/Surgery risks, benefits and alternative options discussed and understood by patient/family.          There are no hospital problems to display for this patient.

## 2018-01-25 NOTE — OP NOTE
[facility] -   General Surgery  Operative Note    SUMMARY     Date of Procedure: 1/25/2018     Procedure: Procedure(s) (LRB):  D&C HYSTEROSCOPY ABLATION ENDOMETRIAL NOVASURE (N/A)       Surgeon(s) and Role:     * Ivet Ward MD - Primary    Assisting Surgeon: None    Pre-Operative Diagnosis: Post-menopausal bleeding [N95.0]    Post-Operative Diagnosis: Post-Op Diagnosis Codes:     * Post-menopausal bleeding [N95.0]    Anesthesia: General    Technical Procedures Used: hysteroscopy with dilation and curettage; novasure ablation    INTRAOPERATIVE FINDINGS:  The patient's overall endometrium appeared         Smooth with no lesions.  The fallopian ostia are noted bilaterally.                                                                                                                                                    DESCRIPTION OF PROCEDURE:  The patient was.                                                                      placed in the dorsal lithotomy position with her legs in       The Wilmer stirrups                                                                     Her peritoneum was then prepped and draped in the normal    sterile fashion and her bladder was drained in and out fashion.  A           weighted sterile speculum was then placed in the patient's vagina.                                                                      Anterior lip of the cervix was grasped with a single-tooth tenaculum.                                                                        The  Cervix was then dilated with the Hegar dilators up to about 7 mm    .  A 0-degree      hysteroscope was then advanced through the cervix into the intrauterine      cavity using normal saline as a distention medium.  Examination of the       entire uterine cavity was performed.  Panhysteroscopic view achieved,  bilateral tubal ostia were         Visualized.      Curettage performed with sharp curette.      PROCEDURE:      Hysteroscopy The patient was taken to the Operating Room where general       endotracheal anesthesia was induced and found to be adequate.  Her perineum was then prepped and draped in normal sterile fashion, and bladder was drained in an in and out fashion with the red rubber catheter.   Time out was performed.                                                    A  speculum was  then placed in the patient's vagina, and the anterior lip of the cervix  was grasped with single-tooth tenaculum.  The uterine cervix was then gently  dilated up to 6 mm using a Hegar dilator.  The uterus was sounded to 9cm.  The cervical length was 3 cm.   A 0 degree hysteroscope was advanced through the cervix to the uterine fundus using normal saline as the distension medium.  The findings stated above were noted.  The hysteroscope was then removed.       The novasure device was then tested.  Inserted through the cervix into the uterine cavity  to fundus then retracted back 1 cm.  Using lateral movements the widest width was noted to be 2.5.  The seal was moved forward on the sleeve.  The wattage was determined to be 57.  The C02 test was passed.  Total ablation time was 45 seconds.  After 1 minute the novasure device was removed.  The tenaculum was removed from the cervix.     All instruments were removed from the vagina. All counts were correct and patient tolerated the procedure well.        Description of the Findings of the Procedure: atrophic endometrium, normal ostia bilateral    Significant Surgical Tasks Conducted by the Assistant(s), if Applicable: n/a    Complications: No    Estimated Blood Loss (EBL): * No values recorded between 1/25/2018  7:20 AM and 1/25/2018  7:55 AM *           Implants: * No implants in log *    Specimens:   Specimen (12h ago through future)    Start     Ordered    01/25/18 2006  Specimen to Pathology - Surgery  Once     Comments:  Endometrial curetteDx. Post-menopausal bleeding      01/25/18 1887                   Condition: Good    Disposition: PACU - hemodynamically stable.    Attestation: I performed the procedure.

## 2018-01-25 NOTE — ANESTHESIA POSTPROCEDURE EVALUATION
"Anesthesia Post Evaluation    Patient: Yolande Scherer    Procedure(s) Performed: Procedure(s) (LRB):  D&C HYSTEROSCOPY ABLATION ENDOMETRIAL NOVASURE (N/A)    Final Anesthesia Type: general  Patient location during evaluation: PACU  Patient participation: Yes- Able to Participate  Level of consciousness: awake  Post-procedure vital signs: reviewed and stable  Pain management: adequate  Airway patency: patent  PONV status at discharge: No PONV  Anesthetic complications: no      Cardiovascular status: stable  Respiratory status: unassisted  Follow-up not needed.        Visit Vitals  /77   Pulse 68   Temp 36.6 °C (97.9 °F) (Temporal)   Resp 19   Ht 5' 1" (1.549 m)   Wt 93.6 kg (206 lb 5.6 oz)   SpO2 98%   Breastfeeding? No   BMI 38.99 kg/m²       Pain/Sudhir Score: Pain Assessment Performed: Yes (1/25/2018  9:50 AM)  Presence of Pain: complains of pain/discomfort (1/25/2018  9:50 AM)  Pain Rating Prior to Med Admin: 3 (1/25/2018  9:25 AM)  Sudhir Score: 10 (1/25/2018  9:50 AM)      "

## 2018-01-25 NOTE — TELEPHONE ENCOUNTER
Spoke to patient's mother and stated the appointment would be made and mailed to patient and if this did not work she should call to change.  Mother stated she would give patient message.  Patient in recovery.

## 2018-01-25 NOTE — TRANSFER OF CARE
"Anesthesia Transfer of Care Note    Patient: Yolande Scherer    Procedure(s) Performed: Procedure(s) (LRB):  D&C HYSTEROSCOPY ABLATION ENDOMETRIAL NOVASURE (N/A)    Patient location: PACU    Anesthesia Type: general    Transport from OR: Transported from OR on room air with adequate spontaneous ventilation    Post pain: adequate analgesia    Post assessment: no apparent anesthetic complications and tolerated procedure well    Post vital signs: stable    Level of consciousness: sedated    Nausea/Vomiting: no nausea/vomiting    Complications: none    Transfer of care protocol was followed      Last vitals:   Visit Vitals  /78   Pulse 92   Temp 36.5 °C (97.7 °F) (Temporal)   Resp 16   Ht 5' 1" (1.549 m)   Wt 93.6 kg (206 lb 5.6 oz)   SpO2 97%   Breastfeeding? No   BMI 38.99 kg/m²     "

## 2018-01-25 NOTE — BRIEF OP NOTE
[facility]  Brief Operative Note     SUMMARY     Surgery Date: 1/25/2018     Surgeon(s) and Role:     * Ivet Ward MD - Primary    Assisting Surgeon: None    Pre-op Diagnosis:  Post-menopausal bleeding [N95.0]    Post-op Diagnosis:  Post-Op Diagnosis Codes:     * Post-menopausal bleeding [N95.0]    Procedure(s) (LRB):  D&C HYSTEROSCOPY ABLATION ENDOMETRIAL NOVASURE (N/A)    Anesthesia: General    Description of the findings of the procedure: atrophic endometrium, bilateral normal ostia    Findings/Key Components: see op note for details    Estimated Blood Loss: * No values recorded between 1/25/2018  7:20 AM and 1/25/2018  7:55 AM *         Specimens:   Specimen (12h ago through future)    Start     Ordered    01/25/18 0739  Specimen to Pathology - Surgery  Once     Comments:  Endometrial curetteDx. Post-menopausal bleeding      01/25/18 0742          Discharge Note    SUMMARY     Admit Date: 1/25/2018    Discharge Date and Time:  01/25/2018 8:05 AM    Hospital Course (synopsis of major diagnoses, care, treatment, and services provided during the course of the hospital stay): Pt underwent hysteroscopy with dilation and curettage and  novasure ablation for post menopausal bleeding.  She tolerated the procedure well and was stable for discharge from the pacu.       Final Diagnosis: Post-Op Diagnosis Codes:     * Post-menopausal bleeding [N95.0]    Disposition: Home or Self Care    Follow Up/Patient Instructions:     Medications:  Reconciled Home Medications:   Current Discharge Medication List      START taking these medications    Details   hydrocodone-acetaminophen 5-325mg (NORCO) 5-325 mg per tablet Take 1 tablet by mouth every 6 (six) hours as needed for Pain.  Qty: 8 tablet, Refills: 0      ibuprofen (ADVIL,MOTRIN) 800 MG tablet Take 1 tablet (800 mg total) by mouth 3 (three) times daily.  Qty: 15 tablet, Refills: 0         CONTINUE these medications which have NOT CHANGED    Details    cholecalciferol, vitamin D3, 1,000 unit capsule Take 1,000 Units by mouth once daily.      cyclobenzaprine (FLEXERIL) 10 MG tablet Take 10 mg by mouth.      EVAMIST 1.53 mg/spray (1.7%) transdermal spray PLACE 3 SPRAYS ONTO THE SKIN ONCE DAILY.  Refills: 11      medroxyPROGESTERone (PROVERA) 5 MG tablet Take 5 mg by mouth once daily.  Refills: 11      omeprazole (PRILOSEC) 40 MG capsule       omeprazole-sodium bicarbonate (ZEGERID) 40-1.1 mg-gram per capsule       traMADol (ULTRAM) 50 mg tablet Take 50 mg by mouth.             Discharge Procedure Orders  Diet general     Other restrictions (specify):   Order Comments: Pelvic rest x 2 weeks (no tampons, intercourse douching); showers only for 2 wks     Call MD for:  temperature >100.4     Call MD for:  persistent nausea and vomiting     Call MD for:  severe uncontrolled pain     Call MD for:  difficulty breathing, headache or visual disturbances     No dressing needed       Follow-up Information     Ivet Ward MD In 3 weeks.    Specialty:  Obstetrics and Gynecology  Why:  post op ck  Contact information:  [address] [address] [phone]               ([place] office)

## 2018-01-25 NOTE — ANESTHESIA PREPROCEDURE EVALUATION
01/25/2018  Yolande Scherer is a 57 y.o., female.    Anesthesia Evaluation      I have reviewed the Medications.     Review of Systems  Anesthesia Hx:  No problems with previous Anesthesia History of prior surgery of interest to airway management or planning:  Denies Personal Hx of Anesthesia complications.   Social:  Non-Smoker, No Alcohol Use    Hematology/Oncology:  Hematology Normal   Oncology Normal     EENT/Dental:EENT/Dental Normal   Cardiovascular:  Cardiovascular Normal     Pulmonary:  Pulmonary Normal    Renal/:  Renal/ Normal     Hepatic/GI:   Liver Disease,    Musculoskeletal:  Musculoskeletal Normal    Neurological:  Neurology Normal    Endocrine:  Endocrine Normal    Dermatological:  Skin Normal    Psych:  Psychiatric Normal           Physical Exam  General:  Well nourished, Obesity    Airway/Jaw/Neck:  Airway Findings: Mouth Opening: Normal Tongue: Normal  General Airway Assessment: Adult  Mallampati: I  TM Distance: Normal, at least 6 cm         Dental:  DENTAL FINDINGS: Normal   Chest/Lungs:  Chest/Lungs Findings: Clear to auscultation, Normal Respiratory Rate     Heart/Vascular:  Heart Findings: Rate: Normal  Rhythm: Regular Rhythm  Sounds: Normal        Mental Status:  Mental Status Findings:  Cooperative, Alert and Oriented         Anesthesia Plan  Type of Anesthesia, risks & benefits discussed:  Anesthesia Type:  general  Patient's Preference:   Intra-op Monitoring Plan:   Intra-op Monitoring Plan Comments:   Post Op Pain Control Plan: multimodal analgesia  Post Op Pain Control Plan Comments:   Induction:   IV  Beta Blocker:  Patient is not currently on a Beta-Blocker (No further documentation required).       Informed Consent: Patient understands risks and agrees with Anesthesia plan.  Questions answered. Anesthesia consent signed with patient.  ASA Score: 1     Day of Surgery  Review of History & Physical:            Ready For Surgery From Anesthesia Perspective.

## 2018-01-29 VITALS
HEART RATE: 68 BPM | DIASTOLIC BLOOD PRESSURE: 77 MMHG | TEMPERATURE: 98 F | RESPIRATION RATE: 19 BRPM | OXYGEN SATURATION: 98 % | SYSTOLIC BLOOD PRESSURE: 123 MMHG | WEIGHT: 206.38 LBS | HEIGHT: 61 IN | BODY MASS INDEX: 38.96 KG/M2

## 2018-02-15 ENCOUNTER — TELEPHONE (OUTPATIENT)
Dept: OBSTETRICS AND GYNECOLOGY | Facility: CLINIC | Age: 58
End: 2018-02-15

## 2018-02-15 NOTE — TELEPHONE ENCOUNTER
----- Message from Kate Velarde sent at 2/15/2018 10:09 AM CST -----  [facility] appt..[phone]

## 2018-02-15 NOTE — TELEPHONE ENCOUNTER
Pt wanted to know if there is a co-pay she has to pay for her upcoming f/u visit. I informed her that via the info on the appt template it states there is a $0 co-pay. She wanted to know if she has to come because she states she is feeling ok. Pt was made aware that we schedule these f/u visit post operation just to physically evaluate patients and it is her option if she desires not to come in. She wanted me to ask Dr. Ward opinion if she would like to see her. Message has been forwarded to provider. PAT

## 2018-03-06 ENCOUNTER — TELEPHONE (OUTPATIENT)
Dept: OBSTETRICS AND GYNECOLOGY | Facility: CLINIC | Age: 58
End: 2018-03-06

## 2018-03-06 DIAGNOSIS — N95.1 SYMPTOMATIC MENOPAUSAL OR FEMALE CLIMACTERIC STATES: Primary | ICD-10-CM

## 2018-03-06 RX ORDER — ESTRADIOL 1 MG/1
2 TABLET ORAL DAILY
Qty: 30 TABLET | Refills: 11 | OUTPATIENT
Start: 2018-03-06 | End: 2018-04-05 | Stop reason: SDUPTHER

## 2018-03-06 NOTE — TELEPHONE ENCOUNTER
----- Message from Bibiana Ivan MA sent at 3/5/2018  4:57 PM CST -----  Pt has been made aware that there's a recall on the Rx evamist, would like to be switched to a pill form. She states she wanted to let you know that she is already taking provera as well.

## 2018-03-08 ENCOUNTER — TELEPHONE (OUTPATIENT)
Dept: OBSTETRICS AND GYNECOLOGY | Facility: CLINIC | Age: 58
End: 2018-03-08

## 2018-03-08 NOTE — TELEPHONE ENCOUNTER
----- Message from Irina Nelson sent at 3/7/2018  3:30 PM CST -----  Contact: [facility] in [facility]  She is calling in regards to the prescription thomas has been in recall and they are requesting an alternative to be prescribed.    She can be reached at [phone]

## 2018-03-08 NOTE — TELEPHONE ENCOUNTER
Pt has been made aware that the replacement to the evamist has been sent to her pharmacy and she is to take the estradiol 2mg once daily. Pt verbalizes understanding. DS

## 2018-04-05 ENCOUNTER — TELEPHONE (OUTPATIENT)
Dept: OBSTETRICS AND GYNECOLOGY | Facility: CLINIC | Age: 58
End: 2018-04-05

## 2018-04-05 DIAGNOSIS — N95.1 SYMPTOMATIC MENOPAUSAL OR FEMALE CLIMACTERIC STATES: Primary | ICD-10-CM

## 2018-04-05 RX ORDER — MEDROXYPROGESTERONE ACETATE 5 MG/1
5 TABLET ORAL DAILY
Qty: 90 TABLET | Refills: 2 | Status: SHIPPED | OUTPATIENT
Start: 2018-04-05 | End: 2018-04-13 | Stop reason: SDUPTHER

## 2018-04-05 RX ORDER — ESTRADIOL 1 MG/1
2 TABLET ORAL DAILY
Qty: 90 TABLET | Refills: 2 | OUTPATIENT
Start: 2018-04-05 | End: 2018-04-13 | Stop reason: SDUPTHER

## 2018-04-13 ENCOUNTER — TELEPHONE (OUTPATIENT)
Dept: OBSTETRICS AND GYNECOLOGY | Facility: CLINIC | Age: 58
End: 2018-04-13

## 2018-04-13 DIAGNOSIS — N95.1 SYMPTOMATIC MENOPAUSAL OR FEMALE CLIMACTERIC STATES: Primary | ICD-10-CM

## 2018-04-13 RX ORDER — ESTRADIOL 2 MG/1
2 TABLET ORAL DAILY
Qty: 90 TABLET | Refills: 3 | OUTPATIENT
Start: 2018-04-13 | End: 2019-03-14 | Stop reason: SDUPTHER

## 2018-04-13 RX ORDER — MEDROXYPROGESTERONE ACETATE 5 MG/1
5 TABLET ORAL DAILY
Qty: 90 TABLET | Refills: 3 | Status: SHIPPED | OUTPATIENT
Start: 2018-04-13 | End: 2019-03-14 | Stop reason: SDUPTHER

## 2018-07-21 ENCOUNTER — NURSE TRIAGE (OUTPATIENT)
Dept: ADMINISTRATIVE | Facility: CLINIC | Age: 58
End: 2018-07-21

## 2018-07-21 NOTE — TELEPHONE ENCOUNTER
Reason for Disposition   [1] Symptoms of anxiety or panic AND [2] has not been evaluated for this by physician    Protocols used: ST ANXIETY AND PANIC ATTACK-A-CALI Lanier reporting symptoms of a panic attack last night, and some symptoms this morning. She recognized these symptoms as being a panic attack she says since she remembers 30 years ago she had similar symptoms which was diagnosed as a panic attack. She reports feeling anxious and is overwhelmed in a caregiver role. 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 Gave advice per protocol and scheduled appt for Monday. Advised to call back with any new or worsening symptoms. Please contact caller with any further care advice.

## 2018-07-23 ENCOUNTER — OFFICE VISIT (OUTPATIENT)
Dept: FAMILY MEDICINE | Facility: CLINIC | Age: 58
End: 2018-07-23
Payer: COMMERCIAL

## 2018-07-23 VITALS
TEMPERATURE: 98 F | WEIGHT: 195 LBS | DIASTOLIC BLOOD PRESSURE: 73 MMHG | HEIGHT: 61 IN | SYSTOLIC BLOOD PRESSURE: 113 MMHG | HEART RATE: 56 BPM | BODY MASS INDEX: 36.82 KG/M2

## 2018-07-23 DIAGNOSIS — F41.0 PANIC ATTACK AS REACTION TO STRESS: Primary | ICD-10-CM

## 2018-07-23 DIAGNOSIS — F43.0 PANIC ATTACK AS REACTION TO STRESS: Primary | ICD-10-CM

## 2018-07-23 DIAGNOSIS — Z63.6 CAREGIVER STRESS: ICD-10-CM

## 2018-07-23 PROCEDURE — 99204 OFFICE O/P NEW MOD 45 MIN: CPT | Mod: S$GLB,,, | Performed by: NURSE PRACTITIONER

## 2018-07-23 PROCEDURE — 3008F BODY MASS INDEX DOCD: CPT | Mod: CPTII,S$GLB,, | Performed by: NURSE PRACTITIONER

## 2018-07-23 PROCEDURE — 99999 PR PBB SHADOW E&M-EST. PATIENT-LVL III: CPT | Mod: PBBFAC,,, | Performed by: NURSE PRACTITIONER

## 2018-07-23 SDOH — SOCIAL DETERMINANTS OF HEALTH (SDOH): DEPENDENT RELATIVE NEEDING CARE AT HOME: Z63.6

## 2018-07-23 NOTE — PROGRESS NOTES
"Subjective:      Patient ID: Yolande Scherer is a 58 y.o. female.    Chief Complaint: Anxiety and Panic Attack    HPI   Ms. Scherer presents with complaint of panic attack Friday night.  She is a patient of Dr. Ernie Diaz in [place] but has recently moved to [place] and does not have a local provider.  She reports she has had 2 prior episodes of panic attacks in the past, 30 years ago, and her symptoms on Friday night were similar to the past panic attacks.  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 She works full-time and cares for her .  Her elderly mother is her only support system, and her mother has been recently sick as well.  Her father is a nursing home.  She reports she has no other support and feels that she became severely overwhelmed Friday.  She feels like she was overwhelmed by her role as primary caregiver and recently being sick herself.  Friday night she reports she became very anxious and nervous feeling, felt very hot, and felt as if she could "just stop breathing".  She reports she used ice on her neck to help cool down and called her mother talk to her which seemed to help some.  She took an old Valium that was prescribed to her years ago and this seemed to help her rest.  She reports since Friday night she has been feeling much better without feelings of anxiety or panic.  She reports she still feels somewhat overwhelmed in her situation, but feels that she is able to cope more appropriately now.  She is not currently interested in taking any medications for anxiety and she is also not interested in counseling at this time.  She states she almost canceled the appointment for this morning because she is feeling much better.  She denies suicidal and homicidal ideations.    Review of Systems   Constitutional: Negative for chills, fatigue and fever.   Respiratory: Negative for cough, shortness of breath and wheezing.    Cardiovascular: Negative for " "chest pain, palpitations and leg swelling.   Skin: Negative for rash and wound.   Neurological: Negative for weakness and numbness.   Psychiatric/Behavioral: Negative for agitation, behavioral problems, confusion, dysphoric mood, self-injury, sleep disturbance and suicidal ideas. The patient is nervous/anxious.        Objective:     /73   Pulse (!) 56   Temp 97.7 °F (36.5 °C) (Oral)   Ht 5' 1" (1.549 m)   Wt 88.5 kg (195 lb)   BMI 36.84 kg/m²     Physical Exam   Constitutional: She is oriented to person, place, and time. She appears well-developed and well-nourished.   HENT:   Head: Normocephalic.   Eyes: Pupils are equal, round, and reactive to light.   Neck: Normal range of motion. Neck supple.   Cardiovascular: Normal rate, regular rhythm and normal heart sounds.    Pulmonary/Chest: Effort normal and breath sounds normal. No respiratory distress. She has no decreased breath sounds. She has no wheezes. She has no rhonchi. She has no rales.   Musculoskeletal: Normal range of motion.   Neurological: She is alert and oriented to person, place, and time.   Skin: Skin is warm and dry. No rash noted.   Psychiatric: She has a normal mood and affect. Her speech is normal and behavior is normal. Judgment and thought content normal. Cognition and memory are normal. She expresses no homicidal and no suicidal ideation. She expresses no suicidal plans and no homicidal plans.   Tearful during appointment at times   Vitals reviewed.    Assessment:     1. Panic attack as reaction to stress    2. Caregiver stress        Plan:     Problem List Items Addressed This Visit     None      Visit Diagnoses     Panic attack as reaction to stress    -  Primary    Caregiver stress          We discussed in detail relaxation techniques and signs of caregiver burnout.  Handout was provided on understanding panic disorder and relaxation.  I have provided Ms. Scherer contact information to a local counselor and encouraged her to reach " out to them.  Patient states she is not ready for medication at this time.  I have encouraged patient to seek treatment if she is feeling overwhelmed and to report to ER if symptoms worsen.      Follow-up if symptoms worsen or fail to improve.        Parts of this note was dictated using voice recognition software. Please excuse any grammatical or typographical errors.

## 2018-07-23 NOTE — PATIENT INSTRUCTIONS
Gabi Montoyajennyfer  [phone]  [email or web address]  [email or web address]          Progressive Relaxation  Your body needs relaxation to reduce stress and undo the fight-or-flight response. It helps to plan for 20 minutes of relaxation every day. This is time for yourself. Sit or lie comfortably. Limit distractions like phones. Listen to soft music or just sit in silence. And try the relaxation technique below.    How to do progressive relaxation  Progressive relaxation helps your whole body relax. To try this technique, follow these steps:  1. Find a quiet room. Sit in a comfortable chair or lie on your back.  2. Breathe in deeply to a slow count of 5. Feel your belly, chest, and back expand. Breathe out slowly to a count of 5.  3. After a few minutes, breathe in deeply. Tighten the muscles in your feet. Notice how it feels. Hold the tension for 3 seconds.  4. Breathe out while relaxing the tightened muscles. Notice how relaxed you feel.  5. Repeat steps 3 and 4 with another muscle group. You can move from your feet, calves, and thighs to your stomach, arms, and hands.  Remember the 4 As  · Avoid a stressor. If someone is smoking when youre trying to quit, leave the room.  · Accept a stressor you cant change, like a job loss, by knowing that your feelings are normal.  · Alter how you deal with a stressor. If a constantly ringing phone is a stressor, let the answering machine .  · Adapt to some stressors. When starting a new exercise program, instead of focusing on how hard it will be, think how good you will feel.  Date Last Reviewed: 2/25/2016  © 2864-8866 [employer]. [address] [place] [address], [place] [address]. All rights reserved. This information is not intended as a substitute for professional medical care. Always follow your healthcare professional's instructions.        Understanding Panic Disorder (Panic Attack)  A panic attack is a sudden, intense fear that lasts  for several minutes when there is no real danger. With it comes terror, physical symptoms, and a strong need to escape from wherever you are. If you have these attacks often, you have panic disorder. The attacks can be very frightening. You may be scared of having another one. You may even stay away from a place where youve had an attack. Some people become so afraid of having panic attacks, its very hard for them to leave home.  Before accepting a diagnosis of panic disorder, its important to see your healthcare provider. Your provider will evaluate your symptoms to make sure you dont have another health condition that is causing the symptoms. Conditions that can cause panic symptoms include certain heart and lung conditions and thyroid disease. Other things can also cause panic attack symptoms. These include having too much caffeine or using other stimulants, such as certain medicines.    What does a panic attack feel like?  Most panic attacks start suddenly, for no clear reason. They last about 5 to 20 minutes. A panic attack can start at any time, even while youre sleeping. During the attack, you may have:  · A sudden surge of anxiety, as if you just missed hitting someone with your car. But with a panic attack, youre anxious for no clear reason  · Physical symptoms, such as sweating, shortness of breath, a pounding heart, trembling, feeling like youre choking, chest pain, nausea, or dizziness  · A fear that youre having a heart attack, dying, or about to lose control  · A feeling that things happening around you are not real  What to do during a panic attack  · Remind yourself that your body is having a false alarm. Nothing bad will happen to you. Youve survived attacks before, and you will this time, too.  · Dont fight your feelings. Let them come. Ride them out. Focus on a task like counting backward from 100. Think about some place relaxing, such as a tropical island or quiet meadow. Ask your  healthcare provider or therapist to suggest other relaxation techniques.  · If your symptoms worsen or occur more often, see your healthcare provider.  Help to overcome the fear  Fear of a panic attack can make you miserable. But with professional support and self-monitoring strategies, this fear can be managed. Ask your healthcare provider or therapist for help. Remember these tips:  · Keep in mind that places and activities dont cause attacks. Separate the attack from the situation. Make an effort not to avoid the situation in the future.  · Dont give in to the temptation to use alcohol or unprescribed medicines as an escape. In the long run, they will only add to your problems.   Warning signs for suicide  Panic disorders can be a discouraging, frightening condition that can lead some people to consider self-harm or suicide. It is very important to work with a trusted therapist, take any medicines as prescribed, and seek help if you have any of these symptoms:  · Thinking often about taking your life  · Planning how you may try to end your life.  · Talking or writing about committing suicide  · Feeling that death is the only solution to your problems  · Feeling a pressing need to make out your will or arrange your   · Giving away things you own  · Participating in risky behaviors, such as sex with someone you don't know or drinking and driving  If you notice any of these warning signs, get help right away. You can call a mental health clinic, a 24-hour suicide crisis hotline, or go to a hospital emergency room.If there is an immediate risk, call 911.   Other resources  National Suicide Prevention Lifeline  623.103.2062 (292-171-SFHE)  www.suicidepreventionlifeline.org    National Suicide Hotline  680.638.8947 (800-SUICIDE)    National [employer] (KIM)  748.686.8423  www.kim.org    [facility]  190.286.8693  www.nmha.org  Date Last Reviewed: 2017  © 8890-1829 [employer]. [address] [place] [address], [place] [address]. All rights reserved. This information is not intended as a substitute for professional medical care. Always follow your healthcare professional's instructions.

## 2018-07-26 ENCOUNTER — OFFICE VISIT (OUTPATIENT)
Dept: FAMILY MEDICINE | Facility: CLINIC | Age: 58
End: 2018-07-26
Payer: COMMERCIAL

## 2018-07-26 VITALS
BODY MASS INDEX: 37.57 KG/M2 | SYSTOLIC BLOOD PRESSURE: 118 MMHG | HEIGHT: 61 IN | TEMPERATURE: 98 F | DIASTOLIC BLOOD PRESSURE: 73 MMHG | HEART RATE: 65 BPM | WEIGHT: 199 LBS

## 2018-07-26 DIAGNOSIS — H81.10 BENIGN PAROXYSMAL POSITIONAL VERTIGO, UNSPECIFIED LATERALITY: Primary | ICD-10-CM

## 2018-07-26 PROCEDURE — 3008F BODY MASS INDEX DOCD: CPT | Mod: CPTII,S$GLB,, | Performed by: NURSE PRACTITIONER

## 2018-07-26 PROCEDURE — 99999 PR PBB SHADOW E&M-EST. PATIENT-LVL III: CPT | Mod: PBBFAC,,, | Performed by: NURSE PRACTITIONER

## 2018-07-26 PROCEDURE — 99213 OFFICE O/P EST LOW 20 MIN: CPT | Mod: S$GLB,,, | Performed by: NURSE PRACTITIONER

## 2018-07-26 RX ORDER — METHYLPREDNISOLONE 4 MG/1
TABLET ORAL
Qty: 1 PACKAGE | Refills: 0 | Status: SHIPPED | OUTPATIENT
Start: 2018-07-26 | End: 2018-08-01

## 2018-07-26 RX ORDER — MECLIZINE HYDROCHLORIDE 25 MG/1
25 TABLET ORAL 3 TIMES DAILY PRN
Qty: 30 TABLET | Refills: 1 | Status: SHIPPED | OUTPATIENT
Start: 2018-07-26 | End: 2019-04-09

## 2018-07-26 NOTE — PROGRESS NOTES
"Subjective:      Patient ID: Yolande Scherer is a 58 y.o. female.    Chief Complaint: Dizziness    HPI   Patient to clinic with complaint of dizziness x 3 days.  Meclizine TID not helping.  Fast movements increase dizziness with nausea.  Sinus infections 3 weeks ago, now resolving.  She cannot identify any other exacerbating or mitigating factors.      Review of Systems   Constitutional: Negative for chills, fatigue and fever.   Respiratory: Negative for cough, shortness of breath and wheezing.    Cardiovascular: Negative for chest pain, palpitations and leg swelling.   Gastrointestinal: Positive for nausea.   Skin: Negative for rash and wound.   Neurological: Positive for dizziness. Negative for weakness and numbness.   Psychiatric/Behavioral: The patient is not nervous/anxious.        Objective:     /73   Pulse 65   Temp 98.2 °F (36.8 °C) (Oral)   Ht 5' 1" (1.549 m)   Wt 90.3 kg (199 lb)   BMI 37.60 kg/m²     Physical Exam   Constitutional: She is oriented to person, place, and time. She appears well-developed and well-nourished.   HENT:   Head: Normocephalic.   Right Ear: Tympanic membrane normal.   Left Ear: Tympanic membrane normal.   Nose: Nose normal.   Mouth/Throat: Oropharynx is clear and moist.   + Carlota Hallpike maneuver    Eyes: Pupils are equal, round, and reactive to light. Right eye exhibits nystagmus. Left eye exhibits nystagmus.   Neck: Normal range of motion. Neck supple.   Cardiovascular: Normal rate, regular rhythm and normal heart sounds.    Pulmonary/Chest: Effort normal and breath sounds normal. No respiratory distress. She has no decreased breath sounds. She has no wheezes. She has no rhonchi. She has no rales.   Neurological: She is alert and oriented to person, place, and time.   Skin: Skin is warm and dry. No rash noted.   Psychiatric: She has a normal mood and affect. Her behavior is normal. Judgment and thought content normal.   Vitals reviewed.    Assessment:     1. " Benign paroxysmal positional vertigo, unspecified laterality        Plan:     Problem List Items Addressed This Visit     None      Visit Diagnoses     Benign paroxysmal positional vertigo, unspecified laterality    -  Primary    Relevant Medications    methylPREDNISolone (MEDROL DOSEPACK) 4 mg tablet    meclizine (ANTIVERT) 25 mg tablet      Will refer to ENT if symptoms continue    Follow-up if symptoms worsen or fail to improve.        Parts of this note was dictated using voice recognition software. Please excuse any grammatical or typographical errors.

## 2018-07-26 NOTE — PATIENT INSTRUCTIONS
Benign Paroxysmal Positional Vertigo     Your health care provider may move your head in certain ways to treat your BPPV.     Benign paroxysmal positional vertigo (BPPV) is a problem with the inner ear. The inner ear contains the vestibular system. This system is what helps you keep your balance. BPPV causes a feeling of spinning. It is a common problem of the vestibular system.  Understanding the vestibular system  The vestibular system of the ear is made up of very tiny parts. They include the utricle, saccule, and semicircular canals. The utricle is a tiny organ that contains calcium crystals. In some people, the crystals can move into the semicircular canals. When this happens, the system no longer works as it should. This causes BPPV. Benign means it is not life-threatening. Paroxysmal means it happens suddenly. Positional means that it happens when you move your head. Vertigo is a feeling of spinning.  What causes BPPV?  Causes include injury to your head or neck. Other problems with the vestibular system may cause BPPV. In many people, the cause of BPPV is not known.  Symptoms of BPPV  You many have repeated feelings of spinning (vertigo). The vertigo usually lasts less than 1 minute. Some movements, suchas rolling over in bed, can bring on vertigo.  Diagnosing BPPV  Your primary health care provider may diagnose and treat your BPPV. Or you may see an ear, nose, and throat doctor (otolaryngologist). In some cases, you may see a nervous system doctor (neurologist).  The health care provider will ask about your symptoms and your medical history. He or she will examine you. You may have hearing and balance tests. As part of the exam, your health care provider may have you move your head and body in certain ways. If you have BPPV, the movements can bring on vertigo. Your provider will also look for abnormal movements of your eyes. You may have other tests to check your vestibular or nervous systems.  Treatment  for BPPV  Your health care provider may try to move the calcium crystals. This is done by having you move your head and neck in certain ways. This treatment is safe and often works well. You may also be told to do these movements at home. You may still have vertigo for a few weeks. Your health care provider will recheck your symptoms, usually in about a month. Special physical therapy may also be part of treatment. In rare cases surgery may be needed for BPPV that does not go away.     When to call the health care provider  Call your health care provider right away if you have any of these:  · Symptoms that do not go away with treatment  · Symptoms that get worse  · New symptoms   Date Last Reviewed: 3/19/2015  © 3873-8372 [employer]. [address] [place] [address], [place] [address]. All rights reserved. This information is not intended as a substitute for professional medical care. Always follow your healthcare professional's instructions.

## 2018-10-23 ENCOUNTER — OFFICE VISIT (OUTPATIENT)
Dept: GASTROENTEROLOGY | Facility: CLINIC | Age: 58
End: 2018-10-23
Payer: COMMERCIAL

## 2018-10-23 ENCOUNTER — HOSPITAL ENCOUNTER (EMERGENCY)
Facility: HOSPITAL | Age: 58
Discharge: HOME | End: 2018-10-23
Attending: EMERGENCY MEDICINE
Payer: COMMERCIAL

## 2018-10-23 VITALS
HEART RATE: 76 BPM | TEMPERATURE: 98 F | WEIGHT: 194.88 LBS | OXYGEN SATURATION: 97 % | DIASTOLIC BLOOD PRESSURE: 71 MMHG | SYSTOLIC BLOOD PRESSURE: 126 MMHG | RESPIRATION RATE: 18 BRPM | HEIGHT: 61 IN | BODY MASS INDEX: 36.8 KG/M2

## 2018-10-23 VITALS
HEIGHT: 61 IN | WEIGHT: 195.13 LBS | BODY MASS INDEX: 36.84 KG/M2 | SYSTOLIC BLOOD PRESSURE: 122 MMHG | DIASTOLIC BLOOD PRESSURE: 74 MMHG

## 2018-10-23 DIAGNOSIS — R11.2 NAUSEA AND VOMITING, INTRACTABILITY OF VOMITING NOT SPECIFIED, UNSPECIFIED VOMITING TYPE: ICD-10-CM

## 2018-10-23 DIAGNOSIS — Z86.010 HX OF ADENOMATOUS COLONIC POLYPS: ICD-10-CM

## 2018-10-23 DIAGNOSIS — R52 GENERALIZED BODY ACHES: ICD-10-CM

## 2018-10-23 DIAGNOSIS — K76.0 NAFLD (NONALCOHOLIC FATTY LIVER DISEASE): ICD-10-CM

## 2018-10-23 DIAGNOSIS — R10.9 ABDOMINAL PAIN, UNSPECIFIED ABDOMINAL LOCATION: Primary | ICD-10-CM

## 2018-10-23 DIAGNOSIS — R19.7 DIARRHEA, UNSPECIFIED TYPE: ICD-10-CM

## 2018-10-23 DIAGNOSIS — R19.7 DIARRHEA OF PRESUMED INFECTIOUS ORIGIN: ICD-10-CM

## 2018-10-23 DIAGNOSIS — R10.84 GENERALIZED ABDOMINAL PAIN: Primary | ICD-10-CM

## 2018-10-23 DIAGNOSIS — G43.A0 CYCLICAL VOMITING WITH NAUSEA, INTRACTABILITY OF VOMITING NOT SPECIFIED: ICD-10-CM

## 2018-10-23 LAB
ALBUMIN SERPL BCP-MCNC: 4 G/DL
ALP SERPL-CCNC: 64 U/L
ALT SERPL W/O P-5'-P-CCNC: 32 U/L
ANION GAP SERPL CALC-SCNC: 11 MMOL/L
AST SERPL-CCNC: 37 U/L
BASOPHILS # BLD AUTO: 0 K/UL
BASOPHILS NFR BLD: 0 %
BILIRUB SERPL-MCNC: 1.3 MG/DL
BILIRUB UR QL STRIP: NEGATIVE
BUN SERPL-MCNC: 10 MG/DL
CALCIUM SERPL-MCNC: 9.1 MG/DL
CHLORIDE SERPL-SCNC: 105 MMOL/L
CLARITY UR: CLEAR
CO2 SERPL-SCNC: 20 MMOL/L
COLOR UR: YELLOW
CREAT SERPL-MCNC: 0.7 MG/DL
DIFFERENTIAL METHOD: ABNORMAL
EOSINOPHIL # BLD AUTO: 0 K/UL
EOSINOPHIL NFR BLD: 0.5 %
ERYTHROCYTE [DISTWIDTH] IN BLOOD BY AUTOMATED COUNT: 13.3 %
EST. GFR  (AFRICAN AMERICAN): >60 ML/MIN/1.73 M^2
EST. GFR  (NON AFRICAN AMERICAN): >60 ML/MIN/1.73 M^2
GLUCOSE SERPL-MCNC: 83 MG/DL
GLUCOSE UR QL STRIP: NEGATIVE
HCT VFR BLD AUTO: 44.4 %
HGB BLD-MCNC: 15.1 G/DL
HGB UR QL STRIP: ABNORMAL
KETONES UR QL STRIP: ABNORMAL
LEUKOCYTE ESTERASE UR QL STRIP: NEGATIVE
LIPASE SERPL-CCNC: 14 U/L
LYMPHOCYTES # BLD AUTO: 1.1 K/UL
LYMPHOCYTES NFR BLD: 16.5 %
MCH RBC QN AUTO: 29.7 PG
MCHC RBC AUTO-ENTMCNC: 34 G/DL
MCV RBC AUTO: 87 FL
MONOCYTES # BLD AUTO: 0.3 K/UL
MONOCYTES NFR BLD: 4.2 %
NEUTROPHILS # BLD AUTO: 5 K/UL
NEUTROPHILS NFR BLD: 79.4 %
NITRITE UR QL STRIP: NEGATIVE
PH UR STRIP: 6 [PH] (ref 5–8)
PLATELET # BLD AUTO: 113 K/UL
PMV BLD AUTO: 10.9 FL
POTASSIUM SERPL-SCNC: 3.8 MMOL/L
PROT SERPL-MCNC: 7.1 G/DL
PROT UR QL STRIP: NEGATIVE
RBC # BLD AUTO: 5.09 M/UL
SODIUM SERPL-SCNC: 136 MMOL/L
SP GR UR STRIP: 1.02 (ref 1–1.03)
URN SPEC COLLECT METH UR: ABNORMAL
UROBILINOGEN UR STRIP-ACNC: 1 EU/DL
WBC # BLD AUTO: 6.36 K/UL

## 2018-10-23 PROCEDURE — 99204 OFFICE O/P NEW MOD 45 MIN: CPT | Mod: S$GLB,,, | Performed by: INTERNAL MEDICINE

## 2018-10-23 PROCEDURE — 96374 THER/PROPH/DIAG INJ IV PUSH: CPT

## 2018-10-23 PROCEDURE — 81003 URINALYSIS AUTO W/O SCOPE: CPT

## 2018-10-23 PROCEDURE — 36415 COLL VENOUS BLD VENIPUNCTURE: CPT

## 2018-10-23 PROCEDURE — 96375 TX/PRO/DX INJ NEW DRUG ADDON: CPT

## 2018-10-23 PROCEDURE — 83690 ASSAY OF LIPASE: CPT

## 2018-10-23 PROCEDURE — 99284 EMERGENCY DEPT VISIT MOD MDM: CPT

## 2018-10-23 PROCEDURE — 25500020 PHARM REV CODE 255: Performed by: EMERGENCY MEDICINE

## 2018-10-23 PROCEDURE — 63600175 PHARM REV CODE 636 W HCPCS: Performed by: EMERGENCY MEDICINE

## 2018-10-23 PROCEDURE — 25000003 PHARM REV CODE 250: Performed by: EMERGENCY MEDICINE

## 2018-10-23 PROCEDURE — 3008F BODY MASS INDEX DOCD: CPT | Mod: CPTII,S$GLB,, | Performed by: INTERNAL MEDICINE

## 2018-10-23 PROCEDURE — 96361 HYDRATE IV INFUSION ADD-ON: CPT

## 2018-10-23 PROCEDURE — 80053 COMPREHEN METABOLIC PANEL: CPT

## 2018-10-23 PROCEDURE — 85025 COMPLETE CBC W/AUTO DIFF WBC: CPT

## 2018-10-23 PROCEDURE — 99999 PR PBB SHADOW E&M-EST. PATIENT-LVL II: CPT | Mod: PBBFAC,,, | Performed by: INTERNAL MEDICINE

## 2018-10-23 RX ORDER — ONDANSETRON 4 MG/1
4 TABLET, FILM COATED ORAL EVERY 8 HOURS PRN
Qty: 12 TABLET | Refills: 0 | Status: SHIPPED | OUTPATIENT
Start: 2018-10-23 | End: 2019-04-09

## 2018-10-23 RX ORDER — CYCLOBENZAPRINE HCL 10 MG
10 TABLET ORAL
COMMUNITY
Start: 2014-08-03 | End: 2019-04-09

## 2018-10-23 RX ORDER — MORPHINE SULFATE 4 MG/ML
4 INJECTION, SOLUTION INTRAMUSCULAR; INTRAVENOUS
Status: COMPLETED | OUTPATIENT
Start: 2018-10-23 | End: 2018-10-23

## 2018-10-23 RX ORDER — OMEPRAZOLE AND SODIUM BICARBONATE 40; 1100 MG/1; MG/1
1 CAPSULE ORAL
COMMUNITY
End: 2018-10-23

## 2018-10-23 RX ORDER — ONDANSETRON 2 MG/ML
4 INJECTION INTRAMUSCULAR; INTRAVENOUS
Status: COMPLETED | OUTPATIENT
Start: 2018-10-23 | End: 2018-10-23

## 2018-10-23 RX ORDER — SIMVASTATIN 20 MG/1
20 TABLET, FILM COATED ORAL
COMMUNITY
Start: 2016-04-25 | End: 2018-10-23

## 2018-10-23 RX ORDER — SODIUM BICARBONATE 325 MG/1
325 TABLET ORAL
COMMUNITY

## 2018-10-23 RX ORDER — ERGOCALCIFEROL 1.25 MG/1
50000 CAPSULE ORAL
COMMUNITY
End: 2020-04-07 | Stop reason: ALTCHOICE

## 2018-10-23 RX ORDER — DICYCLOMINE HYDROCHLORIDE 20 MG/1
20 TABLET ORAL 2 TIMES DAILY
Qty: 20 TABLET | Refills: 0 | Status: SHIPPED | OUTPATIENT
Start: 2018-10-23 | End: 2018-11-22

## 2018-10-23 RX ADMIN — IOHEXOL 75 ML: 350 INJECTION, SOLUTION INTRAVENOUS at 02:10

## 2018-10-23 RX ADMIN — ONDANSETRON 4 MG: 2 INJECTION, SOLUTION INTRAMUSCULAR; INTRAVENOUS at 12:10

## 2018-10-23 RX ADMIN — SODIUM CHLORIDE 1000 ML: 0.9 INJECTION, SOLUTION INTRAVENOUS at 12:10

## 2018-10-23 RX ADMIN — MORPHINE SULFATE 4 MG: 4 INJECTION INTRAVENOUS at 12:10

## 2018-10-23 NOTE — ED PROVIDER NOTES
SCRIBE #1 NOTE: I, Saul Montemayor, am scribing for, and in the presence of, Michael De Souza MD. I have scribed the entire note.      History     Chief Complaint   Patient presents with    Abdominal Pain     abd pain since last night with N/V/D     Review of patient's allergies indicates:  No Known Allergies      History of Present Illness     HPI    10/23/2018, 12:13 PM  History obtained from the patient      History of Present Illness: Yolande Scherer is a 58 y.o. female patient with a PMHx including fibromyalgia, liver cyst, and NAFLD who presents to the Emergency Department for evaluation of abdominal pain which onset gradually yesterday. Pt reports yesterday afternoon she was eating a sandwich but states she did not feel well prior to eating it. Later that afternoon pt reports she developed a pain in her abdomen and a HA that gradually worsened all day. Last night pt reports her abdominal pain was a 10/10. Today pt reports f/u with Dr. Wolf (GI) for further evaluation after she noticed her vomit was brown and was sent here for further evaluation. In ED pt reports her pain is a 5/10. Symptoms are constant and moderate in severity. Exacerbated by nothing and relieved by nothing. Associated sxs include nausea, emesis, and diarrhea. Patient denies any fever, chills, trouble swallowing, appetite change, CP, SOB, blood in stool, dysuria, hematuria, vision change, weakness/numbness, and all other sxs at this time. Pt denies tx PTA. No further complaints or concerns at this time.       Arrival mode: Personal vehicle    PCP: Ernie Diaz MD        Past Medical History:  Past Medical History:   Diagnosis Date    Dry eyes 12/9/2013    Dry mouth 1/17/2014    Fibromyalgia 12/9/2013    History of vitamin D deficiency     Liver cyst     Migraine     NAFLD (nonalcoholic fatty liver disease)     Vertigo        Past Surgical History:  Past Surgical History:   Procedure Laterality Date    ABLATION  ENDOMETRIAL THERMAL - NOVASURE N/A 1/25/2018    Performed by [facility] at [facility]    CHOLECYSTECTOMY      COLONOSCOPY      [id number] N/A 1/25/2018    [facility] at [facility]    TONSILLECTOMY      WISDOM TOOTH EXTRACTION           Family History:  Family History   Problem Relation Age of Onset    Cataracts Mother     Macular degeneration Father     Cataracts Father     Stroke Father     Liver disease Neg Hx        Social History:  Social History     Tobacco Use    Smoking status: Never Smoker    Smokeless tobacco: Never Used   Substance and Sexual Activity    Alcohol use: No    Drug use: No    Sexual activity: No     Partners: Male     Birth control/protection: Post-menopausal        Review of Systems     Review of Systems   Constitutional: Negative for appetite change, chills, diaphoresis and fever.   HENT: Negative for congestion, sore throat and trouble swallowing.    Eyes: Negative for photophobia and visual disturbance.   Respiratory: Negative for cough and shortness of breath.    Cardiovascular: Negative for chest pain.   Gastrointestinal: Positive for abdominal pain, diarrhea, nausea and vomiting (Brown). Negative for blood in stool, constipation and rectal pain.   Genitourinary: Negative for dysuria, frequency, hematuria, urgency, vaginal bleeding and vaginal discharge.   Musculoskeletal: Negative for back pain.   Skin: Negative for rash.   Neurological: Positive for headaches. Negative for dizziness, syncope, weakness, light-headedness and numbness.   Hematological: Does not bruise/bleed easily.   All other systems reviewed and are negative.     Physical Exam     Initial Vitals [10/23/18 1200]   BP Pulse Resp Temp SpO2   132/74 73 18 97.9 °F (36.6 °C) 96 %      MAP       --          Physical Exam  Nursing Notes and Vital Signs Reviewed.  Constitutional: Patient is in no acute distress. Well-developed and well-nourished.  Head: Atraumatic.  "Normocephalic.  Eyes: PERRL. EOM intact. Conjunctivae are not pale. No scleral icterus.  ENT: Mucous membranes are moist. Oropharynx is clear and symmetric.    Neck: Supple. Full ROM.   Cardiovascular: Regular rate. Regular rhythm.  Pulmonary/Chest: No respiratory distress. Clear to auscultation bilaterally. No wheezing or rales.  Abdominal: Soft and non-distended.  Diffuse tenderness.  No rebound, guarding, or rigidity. Good bowel sounds.  Genitourinary: No CVA tenderness  Musculoskeletal: Moves all extremities. No obvious deformities. No edema. No calf tenderness.  Skin: Warm and dry.  Neurological:  Alert, awake, and appropriate.  Normal speech.  No acute focal neurological deficits are appreciated.  Psychiatric: Normal affect. Good eye contact. Appropriate in content.     ED Course   Procedures  ED Vital Signs:  Vitals:    10/23/18 1200 10/23/18 1245 10/23/18 1402 10/23/18 1453   BP: 132/74 126/76 134/75    Pulse: 73 74 65    Resp: 18  20 18   Temp: 97.9 °F (36.6 °C)      TempSrc: Oral      SpO2: 96% 98% 98%    Weight: 88.4 kg (194 lb 14.2 oz)      Height: 5' 1" (1.549 m)       10/23/18 1454 10/23/18 1459   BP: 128/73    Pulse: 70    Resp: (!) 22 18   Temp:     TempSrc:     SpO2: 96%    Weight:     Height:         Abnormal Lab Results:  Labs Reviewed   CBC W/ AUTO DIFFERENTIAL - Abnormal; Notable for the following components:       Result Value    Platelets 113 (*)     Gran% 79.4 (*)     Lymph% 16.5 (*)     All other components within normal limits   COMPREHENSIVE METABOLIC PANEL - Abnormal; Notable for the following components:    CO2 20 (*)     Total Bilirubin 1.3 (*)     All other components within normal limits   URINALYSIS, REFLEX TO URINE CULTURE - Abnormal; Notable for the following components:    Ketones, UA Trace (*)     Occult Blood UA Trace (*)     All other components within normal limits    Narrative:     Preferred Collection Type->Urine, Clean Catch   LIPASE        All Lab Results:  Results for " orders placed or performed during the hospital encounter of 10/23/18   CBC W/ AUTO DIFFERENTIAL   Result Value Ref Range    WBC 6.36 3.90 - 12.70 K/uL    RBC 5.09 4.00 - 5.40 M/uL    Hemoglobin 15.1 12.0 - 16.0 g/dL    Hematocrit 44.4 37.0 - 48.5 %    MCV 87 82 - 98 fL    MCH 29.7 27.0 - 31.0 pg    MCHC 34.0 32.0 - 36.0 g/dL    RDW 13.3 11.5 - 14.5 %    Platelets 113 (L) 150 - 350 K/uL    MPV 10.9 9.2 - 12.9 fL    Gran # (ANC) 5.0 1.8 - 7.7 K/uL    Lymph # 1.1 1.0 - 4.8 K/uL    Mono # 0.3 0.3 - 1.0 K/uL    Eos # 0.0 0.0 - 0.5 K/uL    Baso # 0.00 0.00 - 0.20 K/uL    Gran% 79.4 (H) 38.0 - 73.0 %    Lymph% 16.5 (L) 18.0 - 48.0 %    Mono% 4.2 4.0 - 15.0 %    Eosinophil% 0.5 0.0 - 8.0 %    Basophil% 0.0 0.0 - 1.9 %    Differential Method Automated    Comp. Metabolic Panel   Result Value Ref Range    Sodium 136 136 - 145 mmol/L    Potassium 3.8 3.5 - 5.1 mmol/L    Chloride 105 95 - 110 mmol/L    CO2 20 (L) 23 - 29 mmol/L    Glucose 83 70 - 110 mg/dL    BUN, Bld 10 6 - 20 mg/dL    Creatinine 0.7 0.5 - 1.4 mg/dL    Calcium 9.1 8.7 - 10.5 mg/dL    Total Protein 7.1 6.0 - 8.4 g/dL    Albumin 4.0 3.5 - 5.2 g/dL    Total Bilirubin 1.3 (H) 0.1 - 1.0 mg/dL    Alkaline Phosphatase 64 55 - 135 U/L    AST 37 10 - 40 U/L    ALT 32 10 - 44 U/L    Anion Gap 11 8 - 16 mmol/L    eGFR if African American >60 >60 mL/min/1.73 m^2    eGFR if non African American >60 >60 mL/min/1.73 m^2   Lipase   Result Value Ref Range    Lipase 14 4 - 60 U/L   Urinalysis, Reflex to Urine Culture Urine, Clean Catch   Result Value Ref Range    Specimen UA Urine, Clean Catch     Color, UA Yellow Yellow, Straw, Tish    Appearance, UA Clear Clear    pH, UA 6.0 5.0 - 8.0    Specific Gravity, UA 1.025 1.005 - 1.030    Protein, UA Negative Negative    Glucose, UA Negative Negative    Ketones, UA Trace (A) Negative    Bilirubin (UA) Negative Negative    Occult Blood UA Trace (A) Negative    Nitrite, UA Negative Negative    Urobilinogen, UA 1.0 <2.0 EU/dL     Leukocytes, UA Negative Negative         Imaging Results:  Imaging Results          CT Abdomen Pelvis With Contrast (Final result)  Result time 10/23/18 14:25:29    Final result by EDGARDO Sparrow Sr., MD (10/23/18 14:25:29)                 Impression:      1. The appendix and the rest of the gastrointestinal system are normal in appearance.  2. There is a mild amount of atherosclerosis.  3. The gallbladder is absent. There are surgical clips in the gallbladder fossa.  All CT scans at this facility use dose modulation, iterative reconstruction, and/or weight base dosing when appropriate to reduce radiation dose when appropriate to reduce radiation dose to as low as reasonably achievable.      Electronically signed by: Farzad Sparrow MD  Date:    10/23/2018  Time:    14:25             Narrative:    EXAMINATION:  CT ABDOMEN PELVIS WITH CONTRAST    CLINICAL HISTORY:  Nausea, vomiting, diarrhea;    TECHNIQUE:  Standard abdomen and pelvis CT protocol with IV contrast was performed.  75 mL of Omnipaque 350 contrast material was used for this examination.  There was no oral contrast administered.    FINDINGS:  Finding: Comparison was made to a prior examination performed on 02/24/2014.  The size of the heart is within normal limits.  There are mild dependent atelectatic changes in both lungs.  There is no pneumothorax or pleural effusion.    Incidental note is made of a 5 mm oval shaped hypodense area in the right lobe of the liver.  The gallbladder is absent.  There are surgical clips in the gallbladder fossa.  The pancreas, spleen, adrenals, and kidneys are normal in appearance. The ureters and the urinary bladder are normal in appearance. The appendix and the rest of the gastrointestinal system are normal in appearance. There is no free fluid within the abdomen or pelvis. There is no pneumoperitoneum.  There is a mild amount of atherosclerosis.                                      The Emergency Provider reviewed the  vital signs and test results, which are outlined above.     ED Discussion     2:59 PM: Reassessed pt at this time. Pt is awake, alert, and in NAD. Pt states her condition has improved at this time. Discussed with pt all pertinent ED information and results. Discussed pt dx and plan of tx. Gave pt all f/u and return to the ED instructions. All questions and concerns were addressed at this time. Pt expresses understanding of information and instructions, and is comfortable with plan to discharge. Pt is stable for discharge.    I discussed with patient and/or family/caretaker that evaluation in the ED does not suggest any emergent or life threatening medical conditions requiring immediate intervention beyond what was provided in the ED, and I believe patient is safe for discharge.  Regardless, an unremarkable evaluation in the ED does not preclude the development or presence of a serious of life threatening condition. As such, patient was instructed to return immediately for any worsening or change in current symptoms.    ED Medication(s):  Medications   sodium chloride 0.9% bolus 1,000 mL (0 mLs Intravenous Stopped 10/23/18 1457)   morphine injection 4 mg (4 mg Intravenous Given 10/23/18 1255)   ondansetron injection 4 mg (4 mg Intravenous Given 10/23/18 1255)   omnipaque 350 iohexol 75 mL (75 mLs Intravenous Given 10/23/18 1410)       Current Discharge Medication List      START taking these medications    Details   dicyclomine (BENTYL) 20 mg tablet Take 1 tablet (20 mg total) by mouth 2 (two) times daily.  Qty: 20 tablet, Refills: 0      ondansetron (ZOFRAN) 4 MG tablet Take 1 tablet (4 mg total) by mouth every 8 (eight) hours as needed.  Qty: 12 tablet, Refills: 0               Follow-up Information     Ernie Diaz MD. Call in 2 days.    Specialty:  Family Medicine  Why:  As needed  Contact information:  [address] [place] [address]  Micheal REEVES [address] [phone]             [facility]    Specialty:  Emergency Medicine  Why:  If symptoms worsen  Contact information:  [address] [facility] [address] [phone]                    Medical Decision Making     Medical Decision Making:   Clinical Tests:   Lab Tests: Ordered and Reviewed  Radiological Study: Ordered and Reviewed             Scribe Attestation:   Scribe #1: I performed the above scribed service and the documentation accurately describes the services I performed. I attest to the accuracy of the note. 10/23/2018 12:13 PM    Attending:   Physician Attestation Statement for Scribe #1: I, Michael De Souza,*, personally performed the services described in this documentation, as scribed by Saul Montemayor, in my presence, and it is both accurate and complete.           Clinical Impression       ICD-10-CM ICD-9-CM   1. Abdominal pain, unspecified abdominal location R10.9 789.00   2. Nausea and vomiting, intractability of vomiting not specified, unspecified vomiting type R11.2 787.01   3. Diarrhea, unspecified type R19.7 787.91       Disposition:   Disposition: Discharged  Condition: Stable             Michael De Souza MD  10/23/18 1922

## 2018-10-23 NOTE — PROGRESS NOTES
Subjective:       Patient ID: Yolande Scherer is a 58 y.o. female.    Chief Complaint: Gas; Abdominal Pain; Nausea; and Emesis    Patient had headache yesterday while at work, so she ate a sandwhich. She later developed diffuse abdominal pain. This morning she eventually had nausea and vomiting with emesis noted to be recognition of food she had eaten yesterday. There was a sensation of being feverish and she was too weak to get out of bed yesterday. She says the above symptoms are coming and going. She had a small normal BM this morning, and she has had one subsequently while here which was loose. She took Pepto Bismol this morning at ~ 4 AM which help some but this overall condition has continued. There was no BRBPR or melena. No known ill contacts.     She has a history of Gastroparesis diagnosed ~ 15 years ago; she is not diabetic and the source of this was unknown. She was previously on Domperidone, but only temporarily. She is also S/P cholecystectomy done more than 13 years ago. She is S/P colonoscopy in 2002 with colon polyps removed. She is not sure when last study was done.      Review of Systems   Constitutional: Positive for chills and fatigue. Negative for activity change, appetite change, diaphoresis, fever and unexpected weight change.   HENT: Positive for postnasal drip. Negative for congestion, ear discharge, ear pain, hearing loss, nosebleeds and tinnitus.         Vertigo   Eyes: Negative for photophobia and visual disturbance.   Respiratory: Negative for apnea, cough, choking, chest tightness, shortness of breath and wheezing.    Cardiovascular: Negative for chest pain, palpitations and leg swelling.   Gastrointestinal: Positive for abdominal pain, nausea and vomiting. Negative for abdominal distention, anal bleeding, blood in stool, constipation, diarrhea and rectal pain.   Genitourinary: Negative for difficulty urinating, dyspareunia, dysuria, flank pain, frequency, hematuria, menstrual  problem, pelvic pain, urgency, vaginal bleeding and vaginal discharge.   Musculoskeletal: Positive for back pain and joint swelling. Negative for arthralgias, gait problem, myalgias and neck stiffness.        Joint stiffness     Skin: Negative for pallor and rash.   Neurological: Positive for headaches. Negative for dizziness, tremors, seizures, syncope, speech difficulty, weakness and numbness.   Hematological: Negative for adenopathy.   Psychiatric/Behavioral: Negative for agitation, confusion, hallucinations, sleep disturbance and suicidal ideas.       Objective:      Physical Exam   Constitutional: She is oriented to person, place, and time. She appears well-developed and well-nourished.   HENT:   Head: Normocephalic and atraumatic.   Eyes: Conjunctivae and EOM are normal. Pupils are equal, round, and reactive to light. Right eye exhibits no discharge. Left eye exhibits no discharge. No scleral icterus.   Neck: Normal range of motion. Neck supple. No JVD present. No thyromegaly present.   Cardiovascular: Normal rate, regular rhythm, normal heart sounds and intact distal pulses. Exam reveals no gallop and no friction rub.   No murmur heard.  Pulmonary/Chest: Effort normal and breath sounds normal. No respiratory distress. She has no wheezes. She has no rales. She exhibits no tenderness.   Abdominal: Soft. Bowel sounds are normal. She exhibits distension. She exhibits no mass. There is tenderness. There is guarding. There is no rebound.   Diffuse tenderness. Active BS. No rigidity but quite tender   Musculoskeletal: Normal range of motion. She exhibits no edema.   Lymphadenopathy:     She has no cervical adenopathy.   Neurological: She is alert and oriented to person, place, and time. She has normal reflexes. She exhibits normal muscle tone. Coordination normal.   Skin: Skin is warm and dry. No rash noted. No erythema. No pallor.   Psychiatric: She has a normal mood and affect. Her behavior is normal. Judgment and  thought content normal.   Vitals reviewed.      Assessment:   Gastroenteritis      Abdominal Pain      Nausea and Vomiting      Diarrhea      Body aches  Hx of Gastroparesis      Not recently treated  Hx of Colon Polyps  NAFLD      By history  Fibromyalgia  Probable Sjogren's      Hx of dry eyes and dry mouth  No diagnosis found.    Plan:   Sending to ED for further testing, IV fluids and initiation of antibiotics if needed

## 2018-11-28 ENCOUNTER — TELEPHONE (OUTPATIENT)
Dept: OBSTETRICS AND GYNECOLOGY | Facility: HOSPITAL | Age: 58
End: 2018-11-28

## 2018-12-06 ENCOUNTER — TELEPHONE (OUTPATIENT)
Dept: OBSTETRICS AND GYNECOLOGY | Facility: CLINIC | Age: 58
End: 2018-12-06

## 2019-02-28 RX ORDER — ESTRADIOL 1 MG/1
TABLET ORAL
Qty: 60 TABLET | Refills: 11 | OUTPATIENT
Start: 2019-02-28

## 2019-03-14 ENCOUNTER — TELEPHONE (OUTPATIENT)
Dept: OBSTETRICS AND GYNECOLOGY | Facility: CLINIC | Age: 59
End: 2019-03-14

## 2019-03-14 DIAGNOSIS — N95.1 SYMPTOMATIC MENOPAUSAL OR FEMALE CLIMACTERIC STATES: Primary | ICD-10-CM

## 2019-03-14 RX ORDER — ESTRADIOL 2 MG/1
2 TABLET ORAL DAILY
Qty: 30 TABLET | Refills: 0 | Status: SHIPPED | OUTPATIENT
Start: 2019-03-14 | End: 2019-04-09 | Stop reason: SDUPTHER

## 2019-03-14 RX ORDER — MEDROXYPROGESTERONE ACETATE 5 MG/1
5 TABLET ORAL DAILY
Qty: 30 TABLET | Refills: 0 | Status: SHIPPED | OUTPATIENT
Start: 2019-03-14 | End: 2019-04-09 | Stop reason: SDUPTHER

## 2019-03-14 NOTE — TELEPHONE ENCOUNTER
----- Message from Brenna French sent at 3/14/2019  1:45 PM CDT -----  Contact: pt  Type:  RX Refill Request    Who Called: Patient  Refill or New Rx:Rx  RX Name and Strength:Estradiol  How is the patient currently taking it? (ex. 1XDay):2xday  Is this a 30 day or 90 day RX:90day  Preferred Pharmacy with phone number:[facility]/[phone]  Local or Mail Order:Local  Ordering Provider:Dr Ward  Would the patient rather a call back or a response via [facility]? Call back  Best Call Back Number:[phone]  Additional Information: na

## 2019-03-14 NOTE — TELEPHONE ENCOUNTER
Spoke with pt. Notified her rx will not be refilled until she comes in for an appointment. Scheduled for next available 4/9/19. Pt states she wants Dr. Ward to know that means she will be out of her hormones for a whole month. Notified I would forward the message. Pt verbalized understanding.

## 2019-04-09 ENCOUNTER — OFFICE VISIT (OUTPATIENT)
Dept: OBSTETRICS AND GYNECOLOGY | Facility: CLINIC | Age: 59
End: 2019-04-09
Payer: COMMERCIAL

## 2019-04-09 VITALS
HEIGHT: 61 IN | DIASTOLIC BLOOD PRESSURE: 72 MMHG | BODY MASS INDEX: 37.88 KG/M2 | WEIGHT: 200.63 LBS | SYSTOLIC BLOOD PRESSURE: 116 MMHG

## 2019-04-09 DIAGNOSIS — Z12.4 SCREENING FOR CERVICAL CANCER: ICD-10-CM

## 2019-04-09 DIAGNOSIS — Z12.31 SCREENING MAMMOGRAM, ENCOUNTER FOR: ICD-10-CM

## 2019-04-09 DIAGNOSIS — Z01.419 ENCOUNTER FOR GYNECOLOGICAL EXAMINATION (GENERAL) (ROUTINE) WITHOUT ABNORMAL FINDINGS: Primary | ICD-10-CM

## 2019-04-09 DIAGNOSIS — N95.1 SYMPTOMATIC MENOPAUSAL OR FEMALE CLIMACTERIC STATES: ICD-10-CM

## 2019-04-09 PROCEDURE — 99999 PR PBB SHADOW E&M-EST. PATIENT-LVL III: CPT | Mod: PBBFAC,,, | Performed by: OBSTETRICS & GYNECOLOGY

## 2019-04-09 PROCEDURE — 99396 PR PREVENTIVE VISIT,EST,40-64: ICD-10-PCS | Mod: S$GLB,,, | Performed by: OBSTETRICS & GYNECOLOGY

## 2019-04-09 PROCEDURE — 99999 PR PBB SHADOW E&M-EST. PATIENT-LVL III: ICD-10-PCS | Mod: PBBFAC,,, | Performed by: OBSTETRICS & GYNECOLOGY

## 2019-04-09 PROCEDURE — 99396 PREV VISIT EST AGE 40-64: CPT | Mod: S$GLB,,, | Performed by: OBSTETRICS & GYNECOLOGY

## 2019-04-09 RX ORDER — ESTRADIOL 2 MG/1
2 TABLET ORAL DAILY
Qty: 90 TABLET | Refills: 3 | Status: SHIPPED | OUTPATIENT
Start: 2019-04-09 | End: 2020-04-02 | Stop reason: SDUPTHER

## 2019-04-09 RX ORDER — MEDROXYPROGESTERONE ACETATE 5 MG/1
5 TABLET ORAL DAILY
Qty: 90 TABLET | Refills: 3 | Status: SHIPPED | OUTPATIENT
Start: 2019-04-09 | End: 2020-04-02 | Stop reason: SDUPTHER

## 2019-04-09 NOTE — PROGRESS NOTES
Subjective:       Patient ID: Yolande Scherer is a 59 y.o. female.    Chief Complaint:  Well Woman and Medication Refill (estradiol & provera)      History of Present Illness  HPI  Annual Exam-Postmenopausal  Patient presents for annual exam. The patient has no complaints today. The patient is not currently sexually active. GYN screening history: last pap: approximate date 2017 and was normal and last mammogram: approximate date 2017 and was normal. The patient is taking hormone replacement therapy. Patient denies post-menopausal vaginal bleeding. The patient wears seatbelts: yes. The patient participates in regular exercise: no. Has the patient ever been transfused or tattooed?: no. The patient reports that there is not domestic violence in her life.      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   Date of Last Pap: 7/10/2017    OB History   No data available       Review of Systems  Review of Systems        Objective:      Physical Exam        Assessment:        1. Encounter for gynecological examination (general) (routine) without abnormal findings    2. Symptomatic menopausal or female climacteric states    3. Screening for cervical cancer    4. Screening mammogram, encounter for               Plan:      Pap 2017, due in 2020; Reviewed updated recommendations for pap smears (every 3 years) in low risk patients.   Recommend annual pelvic exams.  Reviewed recommendations for annual CBE.  mammo ordered, continue yearly until age 73  Continue hrt as directed (estrace 2mg 1 daily); provera 5 mg daily  encouraged diet, exercise,   Continue well woman exam

## 2019-04-10 ENCOUNTER — HOSPITAL ENCOUNTER (OUTPATIENT)
Dept: RADIOLOGY | Facility: HOSPITAL | Age: 59
Discharge: HOME | End: 2019-04-10
Attending: OBSTETRICS & GYNECOLOGY
Payer: COMMERCIAL

## 2019-04-10 VITALS — WEIGHT: 200 LBS | HEIGHT: 61 IN | BODY MASS INDEX: 37.76 KG/M2

## 2019-04-10 DIAGNOSIS — Z12.31 SCREENING MAMMOGRAM, ENCOUNTER FOR: ICD-10-CM

## 2019-04-10 DIAGNOSIS — Z01.419 ENCOUNTER FOR GYNECOLOGICAL EXAMINATION (GENERAL) (ROUTINE) WITHOUT ABNORMAL FINDINGS: ICD-10-CM

## 2019-04-10 PROCEDURE — 77067 SCR MAMMO BI INCL CAD: CPT | Mod: TC,PO

## 2019-04-10 PROCEDURE — 77067 MAMMO DIGITAL SCREENING BILAT WITH TOMOSYNTHESIS_CAD: ICD-10-PCS | Mod: 26,,, | Performed by: RADIOLOGY

## 2019-04-10 PROCEDURE — 77063 BREAST TOMOSYNTHESIS BI: CPT | Mod: 26,,, | Performed by: RADIOLOGY

## 2019-04-10 PROCEDURE — 77067 SCR MAMMO BI INCL CAD: CPT | Mod: 26,,, | Performed by: RADIOLOGY

## 2019-04-10 PROCEDURE — 77063 MAMMO DIGITAL SCREENING BILAT WITH TOMOSYNTHESIS_CAD: ICD-10-PCS | Mod: 26,,, | Performed by: RADIOLOGY

## 2019-04-11 ENCOUNTER — TELEPHONE (OUTPATIENT)
Dept: OBSTETRICS AND GYNECOLOGY | Facility: CLINIC | Age: 59
End: 2019-04-11

## 2020-03-30 ENCOUNTER — TELEPHONE (OUTPATIENT)
Dept: OBSTETRICS AND GYNECOLOGY | Facility: CLINIC | Age: 60
End: 2020-03-30

## 2020-03-30 DIAGNOSIS — B37.31 YEAST VAGINITIS: Primary | ICD-10-CM

## 2020-03-30 RX ORDER — FLUCONAZOLE 200 MG/1
200 TABLET ORAL DAILY
Qty: 3 TABLET | Refills: 0 | Status: SHIPPED | OUTPATIENT
Start: 2020-03-30 | End: 2020-04-02

## 2020-03-30 NOTE — TELEPHONE ENCOUNTER
----- Message from Desiree Dumont sent at 3/30/2020  7:02 AM CDT -----  Contact: pt  Pt stated that she has a severe yeast infection and would like something call into her pharmacy. Pt can be reached at [phone]    [facility] #[id number] - [place], [place] - [address] [address]  Phone: [phone] Fax: [phone]    Thanks,  Desiree Dumont

## 2020-04-02 ENCOUNTER — TELEPHONE (OUTPATIENT)
Dept: OBSTETRICS AND GYNECOLOGY | Facility: CLINIC | Age: 60
End: 2020-04-02

## 2020-04-02 DIAGNOSIS — N95.1 SYMPTOMATIC MENOPAUSAL OR FEMALE CLIMACTERIC STATES: ICD-10-CM

## 2020-04-02 RX ORDER — ESTRADIOL 2 MG/1
2 TABLET ORAL DAILY
Qty: 90 TABLET | Refills: 0 | Status: SHIPPED | OUTPATIENT
Start: 2020-04-02 | End: 2020-06-16 | Stop reason: SDUPTHER

## 2020-04-02 RX ORDER — MEDROXYPROGESTERONE ACETATE 5 MG/1
5 TABLET ORAL DAILY
Qty: 90 TABLET | Refills: 0 | Status: SHIPPED | OUTPATIENT
Start: 2020-04-02 | End: 2020-06-16 | Stop reason: SDUPTHER

## 2020-04-02 NOTE — TELEPHONE ENCOUNTER
Refill request sent for hrt  (estrace and provera)  Please [facility] ww exam in June/july

## 2020-04-07 ENCOUNTER — OFFICE VISIT (OUTPATIENT)
Dept: OBSTETRICS AND GYNECOLOGY | Facility: CLINIC | Age: 60
End: 2020-04-07
Payer: COMMERCIAL

## 2020-04-07 ENCOUNTER — TELEPHONE (OUTPATIENT)
Dept: OBSTETRICS AND GYNECOLOGY | Facility: CLINIC | Age: 60
End: 2020-04-07

## 2020-04-07 DIAGNOSIS — N76.2 ACUTE VULVITIS: Primary | ICD-10-CM

## 2020-04-07 PROCEDURE — 99214 OFFICE O/P EST MOD 30 MIN: CPT | Mod: 95,,, | Performed by: OBSTETRICS & GYNECOLOGY

## 2020-04-07 PROCEDURE — 99214 PR OFFICE/OUTPT VISIT, EST, LEVL IV, 30-39 MIN: ICD-10-PCS | Mod: 95,,, | Performed by: OBSTETRICS & GYNECOLOGY

## 2020-04-07 RX ORDER — NYSTATIN AND TRIAMCINOLONE ACETONIDE 100000; 1 [USP'U]/G; MG/G
CREAM TOPICAL
Qty: 60 G | Refills: 2 | Status: SHIPPED | OUTPATIENT
Start: 2020-04-07 | End: 2020-06-16

## 2020-04-07 NOTE — TELEPHONE ENCOUNTER
Scheduled pt for WWE in June. benjamin Martinez    ----- Message from Akil Brown sent at 4/7/2020  7:57 AM CDT -----  Who Called: pt   Symptoms (please be specific):  Itching   How long has patient had these symptoms: 1 week   Pharmacy name and phone #:  ..   [id number] - [place] - [address]   [address] [facility] [address]   Phone: [phone] Fax: [phone]     Would the patient rather a call back or a response via [facility]? Call back   Best Call Back Number: [phone]   Additional Information: Pt states after taking 2 rounds of monistat. She says she is still having issues with the itching.

## 2020-04-07 NOTE — PROGRESS NOTES
Subjective:       Patient ID: Yolande Scherer is a 59 y.o. female.    Chief Complaint:  No chief complaint on file.      History of Present Illness  HPI   The patient location is: [place], [place]  The chief complaint leading to consultation is: vulvar irritation  Visit type: Virtual visit with synchronous audio and video  Total time spent with patient: 15 minutes  Each patient to whom he or she provides medical services by telemedicine is:  (1) informed of the relationship between the physician and patient and the respective role of any other health care provider with respect to management of the patient; and (2) notified that he or she may decline to receive medical services by telemedicine and may withdraw from such care at any time.    Pt took diflucan 3/30  Feels symptoms did not completely resolve; so used 3 day monistat vaginal insert as well as external topical cream    Feels she has scratched so much that she is bleeding  Feels rash has spread to buttocks, upper thighs, mons, labia  Denies vaginal discharge  Showers with dial soap    GYN & OB History  Patient's last menstrual period was 2004.   Date of Last Pap: 7/10/2017    OB History    Para Term  AB Living   1 1 1         SAB TAB Ectopic Multiple Live Births                  # Outcome Date GA Lbr Javier/2nd Weight Sex Delivery Anes PTL Lv   1 Term                Review of Systems  Review of Systems   Genitourinary: Positive for vaginal pain (vulvar itching).           Objective:      Physical Exam:   Constitutional: She is oriented to person, place, and time. She appears well-developed and well-nourished. No distress.     Eyes: Conjunctivae are normal.    Neck: Normal range of motion.     Pulmonary/Chest: Effort normal.                  Musculoskeletal: Normal range of motion.       Neurological: She is alert and oriented to person, place, and time.    Skin: She is not diaphoretic.    Psychiatric: She has a normal mood and affect. Her  behavior is normal. Judgment and thought content normal.           Assessment:        1. Acute vulvitis               Plan:      Gentle skin cleansing  Dove soap  rx sent for mycolog; pt aware may take 1-2 wks for skin issues to resolve  Add antihistamine or consider addition of vistaril for itching  Has ww exam scheduled

## 2020-04-30 ENCOUNTER — TELEPHONE (OUTPATIENT)
Dept: OBSTETRICS AND GYNECOLOGY | Facility: CLINIC | Age: 60
End: 2020-04-30

## 2020-04-30 DIAGNOSIS — Z12.31 SCREENING MAMMOGRAM, ENCOUNTER FOR: Primary | ICD-10-CM

## 2020-04-30 NOTE — TELEPHONE ENCOUNTER
Called pt, Pt has appt for 6/19/20 when she comes that day mammo will be scheduled. Martell KINCAID           ----- Message from Cathleen Bowen sent at 4/30/2020  8:17 AM CDT -----  Contact: pt   Pt needing new orders for a mammo. Mammo was cancelled due to Covid-19 concern. Please contact pt       Pt contact# [phone]

## 2020-06-16 ENCOUNTER — HOSPITAL ENCOUNTER (OUTPATIENT)
Dept: RADIOLOGY | Facility: HOSPITAL | Age: 60
Discharge: HOME | End: 2020-06-16
Attending: OBSTETRICS & GYNECOLOGY
Payer: COMMERCIAL

## 2020-06-16 ENCOUNTER — OFFICE VISIT (OUTPATIENT)
Dept: OBSTETRICS AND GYNECOLOGY | Facility: CLINIC | Age: 60
End: 2020-06-16
Payer: COMMERCIAL

## 2020-06-16 VITALS — WEIGHT: 199.94 LBS | BODY MASS INDEX: 37.75 KG/M2 | HEIGHT: 61 IN

## 2020-06-16 VITALS
DIASTOLIC BLOOD PRESSURE: 82 MMHG | WEIGHT: 196 LBS | BODY MASS INDEX: 37 KG/M2 | HEIGHT: 61 IN | SYSTOLIC BLOOD PRESSURE: 126 MMHG

## 2020-06-16 DIAGNOSIS — Z12.4 SCREENING FOR CERVICAL CANCER: ICD-10-CM

## 2020-06-16 DIAGNOSIS — Z12.31 SCREENING MAMMOGRAM, ENCOUNTER FOR: ICD-10-CM

## 2020-06-16 DIAGNOSIS — N95.1 SYMPTOMATIC MENOPAUSAL OR FEMALE CLIMACTERIC STATES: ICD-10-CM

## 2020-06-16 DIAGNOSIS — Z01.419 ENCOUNTER FOR GYNECOLOGICAL EXAMINATION (GENERAL) (ROUTINE) WITHOUT ABNORMAL FINDINGS: Primary | ICD-10-CM

## 2020-06-16 PROCEDURE — 99396 PR PREVENTIVE VISIT,EST,40-64: ICD-10-PCS | Mod: S$GLB,,, | Performed by: OBSTETRICS & GYNECOLOGY

## 2020-06-16 PROCEDURE — 77067 MAMMO DIGITAL SCREENING BILAT WITH TOMOSYNTHESIS_CAD: ICD-10-PCS | Mod: 26,,, | Performed by: RADIOLOGY

## 2020-06-16 PROCEDURE — 87624 HPV HI-RISK TYP POOLED RSLT: CPT

## 2020-06-16 PROCEDURE — 99396 PREV VISIT EST AGE 40-64: CPT | Mod: S$GLB,,, | Performed by: OBSTETRICS & GYNECOLOGY

## 2020-06-16 PROCEDURE — 99999 PR PBB SHADOW E&M-EST. PATIENT-LVL II: ICD-10-PCS | Mod: PBBFAC,,, | Performed by: OBSTETRICS & GYNECOLOGY

## 2020-06-16 PROCEDURE — 99999 PR PBB SHADOW E&M-EST. PATIENT-LVL II: CPT | Mod: PBBFAC,,, | Performed by: OBSTETRICS & GYNECOLOGY

## 2020-06-16 PROCEDURE — 88175 CYTOPATH C/V AUTO FLUID REDO: CPT

## 2020-06-16 PROCEDURE — 77067 SCR MAMMO BI INCL CAD: CPT | Mod: TC,PO

## 2020-06-16 PROCEDURE — 77067 SCR MAMMO BI INCL CAD: CPT | Mod: 26,,, | Performed by: RADIOLOGY

## 2020-06-16 PROCEDURE — 77063 MAMMO DIGITAL SCREENING BILAT WITH TOMOSYNTHESIS_CAD: ICD-10-PCS | Mod: 26,,, | Performed by: RADIOLOGY

## 2020-06-16 PROCEDURE — 77063 BREAST TOMOSYNTHESIS BI: CPT | Mod: 26,,, | Performed by: RADIOLOGY

## 2020-06-16 RX ORDER — MEDROXYPROGESTERONE ACETATE 5 MG/1
5 TABLET ORAL DAILY
Qty: 90 TABLET | Refills: 4 | Status: SHIPPED | OUTPATIENT
Start: 2020-06-16 | End: 2021-06-16

## 2020-06-16 RX ORDER — ESTRADIOL 2 MG/1
2 TABLET ORAL DAILY
Qty: 90 TABLET | Refills: 4 | Status: SHIPPED | OUTPATIENT
Start: 2020-06-16 | End: 2021-06-16

## 2020-06-16 RX ORDER — DULOXETIN HYDROCHLORIDE 30 MG/1
30 CAPSULE, DELAYED RELEASE ORAL
COMMUNITY
Start: 2020-06-16 | End: 2021-02-10

## 2020-06-16 NOTE — PROGRESS NOTES
Subjective:       Patient ID: Yolande Scherer is a 60 y.o. female.    Chief Complaint:  Well Woman      History of Present Illness  HPI  Annual Exam-Postmenopausal  Patient presents for annual exam. The patient has no complaints today--needs hrt refill; wants fasting cholesterol. The patient is  currently sexually active--denies pelvic pain. GYN screening history: last pap: approximate date  and was normal and last mammogram: approximate date 2019 and was normal.--results pending from this am;  The patient is taking hormone replacement therapy. Patient denies post-menopausal vaginal bleeding. The patient wears seatbelts: yes. The patient participates in regular exercise: no. Has the patient ever been transfused or tattooed?: no. The patient reports that there is not domestic violence in her life.    Denies hot flushes    Has problems staying asleep;   occas leak of urine with laugh         GYN & OB History  Patient's last menstrual period was 2004.   Date of Last Pap: 7/10/2017    OB History    Para Term  AB Living   1 1 1         SAB TAB Ectopic Multiple Live Births                  # Outcome Date GA Lbr Javier/2nd Weight Sex Delivery Anes PTL Lv   1 Term                Review of Systems  Review of Systems   All other systems reviewed and are negative.          Objective:      Physical Exam:   Constitutional: She is oriented to person, place, and time. She appears well-developed.     Eyes: Pupils are equal, round, and reactive to light. Conjunctivae and EOM are normal.    Neck: Normal range of motion. Neck supple.     Pulmonary/Chest: Effort normal. Right breast exhibits no mass, no nipple discharge, no skin change and no tenderness. Left breast exhibits no mass, no nipple discharge, no skin change and no tenderness. Breasts are symmetrical.        Abdominal: Soft.     Genitourinary:    Vagina, uterus, right adnexa, left adnexa and rectum normal.      Pelvic exam was performed with  patient supine.   Cervix is normal. There is a normal right adnexa and a normal left adnexa. Right adnexum displays no mass and no tenderness. Left adnexum displays no mass and no tenderness. No erythema, bleeding, rectocele, cystocele or unspecified prolapse of vaginal walls in the vagina. Labial bartholins normal.Additional cervical findings: pap smear done       Uterus Size: 6 cm   Musculoskeletal: Normal range of motion.       Neurological: She is alert and oriented to person, place, and time.    Skin: Skin is warm.    Psychiatric: She has a normal mood and affect.           Assessment:     Encounter Diagnoses   Name Primary?    Encounter for gynecological examination (general) (routine) without abnormal findings Yes    Screening for cervical cancer     Screening mammogram, encounter for     Symptomatic menopausal or female climacteric states                  Plan:      Continue annual well woman exam.  Pap today; Reviewed updated recommendations for pap smears (every 3 years) in low risk patients.   Recommend annual pelvic exams.  Reviewed recommendations for annual CBE.  hrt refill rx--estrace and provera; pt wishes to continue current dose, aware to call if vaginal bleeding  encoruaged diet, exercise, weight loss  mammo current, awaiting results, continue yearly until age 75  Osteoporosis prevention; 1200mg calcium/d with source of vitamin d  hiv test today; safe sex  Lipid panel, bmp today

## 2020-06-22 LAB
HPV HR 12 DNA SPEC QL NAA+PROBE: NEGATIVE
HPV16 AG SPEC QL: NEGATIVE
HPV18 DNA SPEC QL NAA+PROBE: NEGATIVE

## 2020-06-23 LAB
FINAL PATHOLOGIC DIAGNOSIS: NORMAL
Lab: NORMAL

## 2020-10-01 ENCOUNTER — TELEPHONE (OUTPATIENT)
Dept: OBSTETRICS AND GYNECOLOGY | Facility: CLINIC | Age: 60
End: 2020-10-01

## 2020-10-01 NOTE — TELEPHONE ENCOUNTER
----- Message from Irina Mathur sent at 10/1/2020 11:47 AM CDT -----  Contact: Yolande  Please call Yolande back at [phone] in regards to an issue that she was seen for earlier this year for vaginitis.      Thanks     [facility] - [place] - [address]  [address] [facility]  [place] [address]  Phone: [phone] Fax: [phone]

## 2020-10-01 NOTE — TELEPHONE ENCOUNTER
Returned call to patient.  She c/o severe vaginal discharge, swelling, pain and irritation.  Asked if medication can be prescribed.  Encouraged her to come for an evaluation due to complaints, she verbalized understanding.  Offered NP, she accepted.  Anastasia nicolas for 10/02/20 at 8:30 am.  She confirmed appt, provider and location.  She verbalized understanding of the current visitor and mask policies.

## 2021-02-10 ENCOUNTER — OFFICE VISIT (OUTPATIENT)
Dept: OPHTHALMOLOGY | Facility: CLINIC | Age: 61
End: 2021-02-10
Payer: COMMERCIAL

## 2021-02-10 DIAGNOSIS — H25.13 NUCLEAR SCLEROSIS OF BOTH EYES: Primary | ICD-10-CM

## 2021-02-10 DIAGNOSIS — H52.7 REFRACTIVE ERROR: ICD-10-CM

## 2021-02-10 PROCEDURE — 92015 DETERMINE REFRACTIVE STATE: CPT | Mod: S$GLB,,, | Performed by: OPHTHALMOLOGY

## 2021-02-10 PROCEDURE — 92015 PR REFRACTION: ICD-10-PCS | Mod: S$GLB,,, | Performed by: OPHTHALMOLOGY

## 2021-02-10 PROCEDURE — 99999 PR PBB SHADOW E&M-EST. PATIENT-LVL III: ICD-10-PCS | Mod: PBBFAC,,, | Performed by: OPHTHALMOLOGY

## 2021-02-10 PROCEDURE — 1126F AMNT PAIN NOTED NONE PRSNT: CPT | Mod: S$GLB,,, | Performed by: OPHTHALMOLOGY

## 2021-02-10 PROCEDURE — 92004 PR EYE EXAM, NEW PATIENT,COMPREHESV: ICD-10-PCS | Mod: S$GLB,,, | Performed by: OPHTHALMOLOGY

## 2021-02-10 PROCEDURE — 99999 PR PBB SHADOW E&M-EST. PATIENT-LVL III: CPT | Mod: PBBFAC,,, | Performed by: OPHTHALMOLOGY

## 2021-02-10 PROCEDURE — 1126F PR PAIN SEVERITY QUANTIFIED, NO PAIN PRESENT: ICD-10-PCS | Mod: S$GLB,,, | Performed by: OPHTHALMOLOGY

## 2021-02-10 PROCEDURE — 92004 COMPRE OPH EXAM NEW PT 1/>: CPT | Mod: S$GLB,,, | Performed by: OPHTHALMOLOGY

## 2021-02-10 RX ORDER — SODIUM BICARBONATE 650 MG/1
650 TABLET ORAL DAILY
COMMUNITY
Start: 2020-11-25

## 2023-08-09 ENCOUNTER — TELEPHONE (OUTPATIENT)
Dept: FAMILY MEDICINE | Facility: CLINIC | Age: 63
End: 2023-08-09
Payer: COMMERCIAL

## 2023-08-09 NOTE — TELEPHONE ENCOUNTER
----- Message from Deep Davis sent at 8/9/2023  3:11 PM CDT -----  Contact: Daughter  Type: Needs Medical Advice  Who Called:  Daughter    Best Call Back Number: [phone]    Additional Information: States she would like to speak with office regarding previous visit says she need info about pt next appt that was lulú for her.Please call back

## 2025-01-06 ENCOUNTER — TELEPHONE (OUTPATIENT)
Dept: FAMILY MEDICINE | Facility: CLINIC | Age: 65
End: 2025-01-06
Payer: MEDICARE

## 2025-01-06 NOTE — TELEPHONE ENCOUNTER
----- Message from Norman sent at 1/3/2025  2:23 PM CST -----  Contact: self  Type: Needs Medical Advice  Who Called:  PT    Best Call Back Number: [phone]   Additional Information: PT is calling to schedule a new PT appt and she said that Dr. Ivan said she will accept her as a new pt because she is Yolande Herrera daughter.   Phone: [phone]                 [facility]           Fax: [phone]                           Outpatient Physical Therapy                                                                            Daily Note    Patient: Litzy Lou : 1958  [facility] #: [id number]   Referring Physician: Elder Montelongo MD  Date: 2024    Diagnosis: s/p L TKR Z96.652  Treatment Diagnosis: pain in knee    Onset Date: 10/21/24  PT Insurance Information: [employer]  Total # of Visits Approved: 12 Per Physician Order  Total # of Visits to Date: 4  No Show: 0  Canceled Appointment: 0    24 Plan of Care/Recert Due    Pre-Treatment Pain:  3/10  Subjective: Pt states she is doing well, knee pain is an ache today 2-3/10.    Exercises:  Exercise 2: Sci-Fit x10 min, HS stretch, knee flexion stretch 2x30\" ea  Exercise 3: Supine ex: SLR x 10 , quad set 5\" hold x 10, heel slide 2x10  Exercise 4: Seated LAQ x10  Exercise 8: BTB TKE x 10 each  Exercise 9: FSU/LSU x 10 each    Modality:     Modality Flow Sheet:   Performed (X) Tx Modality   x Electrical Stim: IFC and CP to L knee for pain/edema x 10 min         Assessment  Assessment: Continued with LE ther ex this date. Addition of SLR, BTB TKE and FSU/LSU. Pt with good tolerace to new exercises. Pt requires min verbal and tactile cueing with TKE and for proper lei with step ups. Pt cane re adjusted to proper height to aid in  proper gait mechanics. IFC and CP to L knee for pain and edema at end of session. Will continue to progress.    Activity Tolerance  Activity Tolerance: Patient tolerated treatment well    Patient Education  Patient Education: HEP  Pt verbalized/demonstrated good understanding:     [x] Yes         [] No, pt required further clarification.       Post Treatment Pain:  2/10      Plan  Plan Frequency: 3  Plan weeks: 4       Goals  (Total # of Visits to Date: 4)      Short Term Goals  Time Frame for Short Term Goals: 2 weeks  Short

## 2025-01-07 ENCOUNTER — TELEPHONE (OUTPATIENT)
Dept: FAMILY MEDICINE | Facility: CLINIC | Age: 65
End: 2025-01-07
Payer: MEDICARE

## 2025-01-07 NOTE — TELEPHONE ENCOUNTER
----- Message from Gianfrancocaitlyn sent at 1/6/2025 10:11 AM CST -----  Regarding: appointment  Contact: patient  Type:  Patient Returning Call    Who Called:patient  Who Left Message for Patient:nurse  Does the patient know what this is regarding?:patient stated she spoke with MD Desire Ivan told her to call office to set appointment/ Call center unable to schedule new patient with MD Ivan  Would the patient rather a call back or a response via [facility]? Please call to schedule  Best Call Back Number:[phone]  Additional Information:

## 2025-01-15 ENCOUNTER — LAB VISIT (OUTPATIENT)
Dept: LAB | Facility: HOSPITAL | Age: 65
End: 2025-01-15
Attending: FAMILY MEDICINE
Payer: MEDICARE

## 2025-01-15 ENCOUNTER — OFFICE VISIT (OUTPATIENT)
Dept: FAMILY MEDICINE | Facility: CLINIC | Age: 65
End: 2025-01-15
Payer: MEDICARE

## 2025-01-15 VITALS
WEIGHT: 208.13 LBS | HEART RATE: 75 BPM | HEIGHT: 61 IN | SYSTOLIC BLOOD PRESSURE: 130 MMHG | BODY MASS INDEX: 39.3 KG/M2 | DIASTOLIC BLOOD PRESSURE: 84 MMHG | OXYGEN SATURATION: 96 %

## 2025-01-15 DIAGNOSIS — Z78.0 POST-MENOPAUSAL: ICD-10-CM

## 2025-01-15 DIAGNOSIS — K76.0 NAFLD (NONALCOHOLIC FATTY LIVER DISEASE): ICD-10-CM

## 2025-01-15 DIAGNOSIS — E78.2 MIXED HYPERLIPIDEMIA: ICD-10-CM

## 2025-01-15 DIAGNOSIS — K59.09 CHRONIC CONSTIPATION: ICD-10-CM

## 2025-01-15 DIAGNOSIS — D17.9 LIPOMA, UNSPECIFIED SITE: Primary | ICD-10-CM

## 2025-01-15 DIAGNOSIS — E66.812 CLASS 2 SEVERE OBESITY DUE TO EXCESS CALORIES WITH SERIOUS COMORBIDITY AND BODY MASS INDEX (BMI) OF 38.0 TO 38.9 IN ADULT: ICD-10-CM

## 2025-01-15 DIAGNOSIS — E66.01 CLASS 2 SEVERE OBESITY DUE TO EXCESS CALORIES WITH SERIOUS COMORBIDITY AND BODY MASS INDEX (BMI) OF 38.0 TO 38.9 IN ADULT: ICD-10-CM

## 2025-01-15 DIAGNOSIS — Z23 NEED FOR VACCINATION: ICD-10-CM

## 2025-01-15 LAB
ALBUMIN SERPL BCP-MCNC: 4.1 G/DL (ref 3.5–5.2)
ALP SERPL-CCNC: 64 U/L (ref 40–150)
ALT SERPL W/O P-5'-P-CCNC: 15 U/L (ref 10–44)
ANION GAP SERPL CALC-SCNC: 9 MMOL/L (ref 8–16)
AST SERPL-CCNC: 20 U/L (ref 10–40)
BASOPHILS # BLD AUTO: 0.03 K/UL (ref 0–0.2)
BASOPHILS NFR BLD: 0.4 % (ref 0–1.9)
BILIRUB SERPL-MCNC: 0.6 MG/DL (ref 0.1–1)
BUN SERPL-MCNC: 12 MG/DL (ref 8–23)
CALCIUM SERPL-MCNC: 9.6 MG/DL (ref 8.7–10.5)
CHLORIDE SERPL-SCNC: 107 MMOL/L (ref 95–110)
CHOLEST SERPL-MCNC: 220 MG/DL (ref 120–199)
CHOLEST/HDLC SERPL: 4.9 {RATIO} (ref 2–5)
CO2 SERPL-SCNC: 26 MMOL/L (ref 23–29)
CREAT SERPL-MCNC: 0.8 MG/DL (ref 0.5–1.4)
DIFFERENTIAL METHOD BLD: NORMAL
EOSINOPHIL # BLD AUTO: 0.2 K/UL (ref 0–0.5)
EOSINOPHIL NFR BLD: 2.7 % (ref 0–8)
ERYTHROCYTE [DISTWIDTH] IN BLOOD BY AUTOMATED COUNT: 13.2 % (ref 11.5–14.5)
EST. GFR  (NO RACE VARIABLE): >60 ML/MIN/1.73 M^2
GLUCOSE SERPL-MCNC: 90 MG/DL (ref 70–110)
HCT VFR BLD AUTO: 41.9 % (ref 37–48.5)
HDLC SERPL-MCNC: 45 MG/DL (ref 40–75)
HDLC SERPL: 20.5 % (ref 20–50)
HGB BLD-MCNC: 13.5 G/DL (ref 12–16)
IMM GRANULOCYTES # BLD AUTO: 0.02 K/UL (ref 0–0.04)
IMM GRANULOCYTES NFR BLD AUTO: 0.2 % (ref 0–0.5)
LDLC SERPL CALC-MCNC: 131.6 MG/DL (ref 63–159)
LYMPHOCYTES # BLD AUTO: 2.7 K/UL (ref 1–4.8)
LYMPHOCYTES NFR BLD: 31.3 % (ref 18–48)
MCH RBC QN AUTO: 29.5 PG (ref 27–31)
MCHC RBC AUTO-ENTMCNC: 32.2 G/DL (ref 32–36)
MCV RBC AUTO: 92 FL (ref 82–98)
MONOCYTES # BLD AUTO: 0.6 K/UL (ref 0.3–1)
MONOCYTES NFR BLD: 6.8 % (ref 4–15)
NEUTROPHILS # BLD AUTO: 5 K/UL (ref 1.8–7.7)
NEUTROPHILS NFR BLD: 58.6 % (ref 38–73)
NONHDLC SERPL-MCNC: 175 MG/DL
NRBC BLD-RTO: 0 /100 WBC
PLATELET # BLD AUTO: 291 K/UL (ref 150–450)
PMV BLD AUTO: 10 FL (ref 9.2–12.9)
POTASSIUM SERPL-SCNC: 4.1 MMOL/L (ref 3.5–5.1)
PROT SERPL-MCNC: 7.6 G/DL (ref 6–8.4)
RBC # BLD AUTO: 4.57 M/UL (ref 4–5.4)
SODIUM SERPL-SCNC: 142 MMOL/L (ref 136–145)
TRIGL SERPL-MCNC: 217 MG/DL (ref 30–150)
WBC # BLD AUTO: 8.48 K/UL (ref 3.9–12.7)

## 2025-01-15 PROCEDURE — G0009 ADMIN PNEUMOCOCCAL VACCINE: HCPCS | Mod: S$GLB,,, | Performed by: FAMILY MEDICINE

## 2025-01-15 PROCEDURE — 1101F PT FALLS ASSESS-DOCD LE1/YR: CPT | Mod: CPTII,S$GLB,, | Performed by: FAMILY MEDICINE

## 2025-01-15 PROCEDURE — 80053 COMPREHEN METABOLIC PANEL: CPT | Performed by: FAMILY MEDICINE

## 2025-01-15 PROCEDURE — 1126F AMNT PAIN NOTED NONE PRSNT: CPT | Mod: CPTII,S$GLB,, | Performed by: FAMILY MEDICINE

## 2025-01-15 PROCEDURE — 36415 COLL VENOUS BLD VENIPUNCTURE: CPT | Mod: PO | Performed by: FAMILY MEDICINE

## 2025-01-15 PROCEDURE — 3075F SYST BP GE 130 - 139MM HG: CPT | Mod: CPTII,S$GLB,, | Performed by: FAMILY MEDICINE

## 2025-01-15 PROCEDURE — 80061 LIPID PANEL: CPT | Performed by: FAMILY MEDICINE

## 2025-01-15 PROCEDURE — 90677 PCV20 VACCINE IM: CPT | Mod: S$GLB,,, | Performed by: FAMILY MEDICINE

## 2025-01-15 PROCEDURE — 85025 COMPLETE CBC W/AUTO DIFF WBC: CPT | Performed by: FAMILY MEDICINE

## 2025-01-15 PROCEDURE — 99204 OFFICE O/P NEW MOD 45 MIN: CPT | Mod: 25,S$GLB,, | Performed by: FAMILY MEDICINE

## 2025-01-15 PROCEDURE — 3079F DIAST BP 80-89 MM HG: CPT | Mod: CPTII,S$GLB,, | Performed by: FAMILY MEDICINE

## 2025-01-15 PROCEDURE — 1160F RVW MEDS BY RX/DR IN RCRD: CPT | Mod: CPTII,S$GLB,, | Performed by: FAMILY MEDICINE

## 2025-01-15 PROCEDURE — 99999 PR PBB SHADOW E&M-EST. PATIENT-LVL IV: CPT | Mod: PBBFAC,,, | Performed by: FAMILY MEDICINE

## 2025-01-15 PROCEDURE — 1159F MED LIST DOCD IN RCRD: CPT | Mod: CPTII,S$GLB,, | Performed by: FAMILY MEDICINE

## 2025-01-15 PROCEDURE — 3288F FALL RISK ASSESSMENT DOCD: CPT | Mod: CPTII,S$GLB,, | Performed by: FAMILY MEDICINE

## 2025-01-15 PROCEDURE — 3008F BODY MASS INDEX DOCD: CPT | Mod: CPTII,S$GLB,, | Performed by: FAMILY MEDICINE

## 2025-01-15 RX ORDER — PROGESTERONE 200 MG/1
200 CAPSULE ORAL
COMMUNITY
Start: 2025-01-08

## 2025-01-15 RX ORDER — MULTIVITAMIN
1 TABLET ORAL DAILY
COMMUNITY

## 2025-01-15 RX ORDER — PROGESTERONE 100 MG/1
100 CAPSULE ORAL NIGHTLY
COMMUNITY
Start: 2024-08-27

## 2025-01-15 NOTE — PROGRESS NOTES
Subjective:       Patient ID: Yolande Scherer is a 64 y.o. female.    Chief Complaint: Establish Care    This pt is new to me and to this clinic.  The pt presents to establish care.  The pt is followed by GYN in [place] and is on HRT.  She uses OTC omeprazole daily and gets good control of her GERD symptoms.  The pt has lipomas and has had some removed in the past.    She has some now that are bothering her and she wants to have them removed.  She has a hx of chronic constipation.  She is on Colace.  She was given a script for Linzess in the past and she could not afford it.  The pt has an upcoming appt with urologist for incontinence.  She reports a history of high cholesterol.  Years ago she was on Lipitor.  Discussed health maintenance with pt and will schedule appropriate studies and/or immunizations.        Review of Systems    Objective:      Physical Exam  Vitals and nursing note reviewed.   Constitutional:       Appearance: Normal appearance. She is well-developed. She is obese. She is not ill-appearing.   HENT:      Head: Normocephalic.   Eyes:      Conjunctiva/sclera: Conjunctivae normal.      Pupils: Pupils are equal, round, and reactive to light.   Neck:      Thyroid: No thyromegaly.      Vascular: No carotid bruit.   Cardiovascular:      Rate and Rhythm: Normal rate and regular rhythm.      Heart sounds: Normal heart sounds.   Pulmonary:      Effort: Pulmonary effort is normal.      Breath sounds: Normal breath sounds.   Abdominal:      General: Bowel sounds are normal.      Palpations: Abdomen is soft.      Tenderness: There is no abdominal tenderness.   Musculoskeletal:         General: No tenderness or deformity. Normal range of motion.      Cervical back: Normal range of motion and neck supple.      Right lower leg: No edema.      Left lower leg: No edema.   Lymphadenopathy:      Cervical: No cervical adenopathy.   Skin:     General: Skin is warm and dry.   Neurological:      Mental Status:  She is alert and oriented to person, place, and time.      Cranial Nerves: No cranial nerve deficit.      Motor: No abnormal muscle tone.      Coordination: Coordination normal.      Deep Tendon Reflexes: Reflexes normal.   Psychiatric:         Behavior: Behavior normal.         Assessment:       1. Lipoma, unspecified site    2. Class 2 severe obesity due to excess calories with serious comorbidity and body mass index (BMI) of 38.0 to 38.9 in adult    3. NAFLD (nonalcoholic fatty liver disease)    4. Chronic constipation    5. Post-menopausal    6. Need for vaccination    7. Mixed hyperlipidemia        Plan:       Yolande was seen today for establish care.    Diagnoses and all orders for this visit:    Lipoma, unspecified site  -     Ambulatory referral/consult to General Surgery; Future    Class 2 severe obesity due to excess calories with serious comorbidity and body mass index (BMI) of 38.0 to 38.9 in adult  Comments:  Encouraged healthy diet and exercise    NAFLD (nonalcoholic fatty liver disease)  -     Comprehensive Metabolic Panel; Future    Chronic constipation  -     linaCLOtide (LINZESS) 145 mcg Cap capsule; Take 1 capsule (145 mcg total) by mouth before breakfast.    Post-menopausal  -     DXA Bone Density Axial Skeleton 1 or more sites; Future    Need for vaccination  -     pneumoc 20-chon conj-dip cr(PF) (PREVNAR-20 (PF)) injection Syrg 0.5 mL    Mixed hyperlipidemia  -     CBC Auto Differential; Future  -     Comprehensive Metabolic Panel; Future  -     Lipid Panel; Future      During this visit, I reviewed the pt's history, medications, allergies, and problem list.

## 2025-01-27 ENCOUNTER — HOSPITAL ENCOUNTER (OUTPATIENT)
Dept: RADIOLOGY | Facility: HOSPITAL | Age: 65
Discharge: HOME | End: 2025-01-27
Attending: FAMILY MEDICINE
Payer: MEDICARE

## 2025-01-27 ENCOUNTER — OFFICE VISIT (OUTPATIENT)
Dept: SURGERY | Facility: CLINIC | Age: 65
End: 2025-01-27
Payer: MEDICARE

## 2025-01-27 VITALS
SYSTOLIC BLOOD PRESSURE: 169 MMHG | BODY MASS INDEX: 39.08 KG/M2 | HEIGHT: 61 IN | HEART RATE: 54 BPM | WEIGHT: 207 LBS | DIASTOLIC BLOOD PRESSURE: 79 MMHG | TEMPERATURE: 97 F

## 2025-01-27 DIAGNOSIS — D17.9 LIPOMA, UNSPECIFIED SITE: ICD-10-CM

## 2025-01-27 DIAGNOSIS — Z78.0 POST-MENOPAUSAL: ICD-10-CM

## 2025-01-27 PROCEDURE — 3008F BODY MASS INDEX DOCD: CPT | Mod: CPTII,S$GLB,, | Performed by: STUDENT IN AN ORGANIZED HEALTH CARE EDUCATION/TRAINING PROGRAM

## 2025-01-27 PROCEDURE — 77080 DXA BONE DENSITY AXIAL: CPT | Mod: 26,,, | Performed by: RADIOLOGY

## 2025-01-27 PROCEDURE — 77080 DXA BONE DENSITY AXIAL: CPT | Mod: TC,PO

## 2025-01-27 PROCEDURE — 99203 OFFICE O/P NEW LOW 30 MIN: CPT | Mod: S$GLB,,, | Performed by: STUDENT IN AN ORGANIZED HEALTH CARE EDUCATION/TRAINING PROGRAM

## 2025-01-27 PROCEDURE — 3288F FALL RISK ASSESSMENT DOCD: CPT | Mod: CPTII,S$GLB,, | Performed by: STUDENT IN AN ORGANIZED HEALTH CARE EDUCATION/TRAINING PROGRAM

## 2025-01-27 PROCEDURE — 3077F SYST BP >= 140 MM HG: CPT | Mod: CPTII,S$GLB,, | Performed by: STUDENT IN AN ORGANIZED HEALTH CARE EDUCATION/TRAINING PROGRAM

## 2025-01-27 PROCEDURE — 99999 PR PBB SHADOW E&M-EST. PATIENT-LVL III: CPT | Mod: PBBFAC,,, | Performed by: STUDENT IN AN ORGANIZED HEALTH CARE EDUCATION/TRAINING PROGRAM

## 2025-01-27 PROCEDURE — 1126F AMNT PAIN NOTED NONE PRSNT: CPT | Mod: CPTII,S$GLB,, | Performed by: STUDENT IN AN ORGANIZED HEALTH CARE EDUCATION/TRAINING PROGRAM

## 2025-01-27 PROCEDURE — 3078F DIAST BP <80 MM HG: CPT | Mod: CPTII,S$GLB,, | Performed by: STUDENT IN AN ORGANIZED HEALTH CARE EDUCATION/TRAINING PROGRAM

## 2025-01-27 PROCEDURE — 1159F MED LIST DOCD IN RCRD: CPT | Mod: CPTII,S$GLB,, | Performed by: STUDENT IN AN ORGANIZED HEALTH CARE EDUCATION/TRAINING PROGRAM

## 2025-01-27 PROCEDURE — 1101F PT FALLS ASSESS-DOCD LE1/YR: CPT | Mod: CPTII,S$GLB,, | Performed by: STUDENT IN AN ORGANIZED HEALTH CARE EDUCATION/TRAINING PROGRAM

## 2025-01-27 NOTE — PROGRESS NOTES
History & Physical    Subjective     History of Present Illness:  Patient is a 64 y.o. female presents with a subcutaneous mass in the right shoulder as well as the right elbow.  Patient also has bilateral medial knee swelling.    Chief Complaint   Patient presents with    Consult     Lipoma on rt shoulder       Review of patient's allergies indicates:  No Known Allergies    Current Outpatient Medications   Medication Sig Dispense Refill    estradioL (ESTRACE) 2 MG tablet Take 1 tablet (2 mg total) by mouth once daily. 90 tablet 4    multivitamin (THERAGRAN) per tablet Take 1 tablet by mouth once daily.      omeprazole (PRILOSEC) 40 MG capsule       progesterone (PROMETRIUM) 100 MG capsule Take 100 mg by mouth every evening.      progesterone (PROMETRIUM) 200 MG capsule Take 200 mg by mouth.      linaCLOtide (LINZESS) 145 mcg Cap capsule Take 1 capsule (145 mcg total) by mouth before breakfast. (Patient not taking: Reported on 1/27/2025) 30 capsule 1     No current facility-administered medications for this visit.       Past Medical History:   Diagnosis Date    Dry eyes 12/9/2013    Dry mouth 1/17/2014    Fibromyalgia 12/9/2013    History of vitamin D deficiency     Liver cyst     Migraine     NAFLD (nonalcoholic fatty liver disease)     Vertigo      Past Surgical History:   Procedure Laterality Date    CHOLECYSTECTOMY      COLONOSCOPY      TONSILLECTOMY      WISDOM TOOTH EXTRACTION       Family History   Problem Relation Name Age of Onset    Cataracts Mother      Macular degeneration Father      Cataracts Father      Stroke Father      Dementia Father      Breast cancer Cousin      Glaucoma Maternal Grandmother      Liver disease Neg Hx      Retinal detachment Neg Hx       Social History     Tobacco Use    Smoking status: Never    Smokeless tobacco: Never   Substance Use Topics    Alcohol use: No    Drug use: No        Review of Systems:  Review of Systems   Constitutional:  Negative for fever.   HENT: Negative.    "  Eyes: Negative.    Respiratory: Negative.     Cardiovascular: Negative.    Gastrointestinal: Negative.    Endocrine: Negative.    Genitourinary: Negative.    Musculoskeletal: Negative.    Skin: Negative.    Allergic/Immunologic: Negative.    Neurological: Negative.    Hematological: Negative.    Psychiatric/Behavioral: Negative.            Objective     Vital Signs (Most Recent)  Temp: 97.2 °F (36.2 °C) (01/27/25 0914)  Pulse: (!) 54 (01/27/25 0914)  BP: (!) 169/79 (01/27/25 0914)  5' 1" (1.549 m)  93.9 kg (207 lb 0.2 oz)     Physical Exam:  Physical Exam  Constitutional:       General: She is not in acute distress.     Appearance: Normal appearance. She is not ill-appearing, toxic-appearing or diaphoretic.   HENT:      Head: Normocephalic.      Nose: Nose normal.   Eyes:      Conjunctiva/sclera: Conjunctivae normal.   Cardiovascular:      Rate and Rhythm: Normal rate and regular rhythm.   Pulmonary:      Effort: Pulmonary effort is normal.   Abdominal:      Palpations: Abdomen is soft.   Musculoskeletal:         General: Normal range of motion.      Cervical back: Normal range of motion.   Skin:     General: Skin is warm.      Comments: Well-circumscribed mass on the right shoulder near the glenohumeral joint space.  Possible atypical lipoma versus synovial cyst.  On the left elbow there is a smaller skin mass.  No clearly marginated fatty tumor on either knee   Neurological:      General: No focal deficit present.      Mental Status: She is alert.   Psychiatric:         Mood and Affect: Mood normal.              Assessment and Plan   64-year-old female with benign skin findings as above.  Given the shouldered mass close proximity to the joint space and atypical exam for a true lipoma, I am concerned this may be a synovial cyst.  Skin findings in the left elbow are benign.  No clearly palpable lipomas on either knee.  This may be asymmetric fat distribution.    PLAN:  Discussed observation versus additional " imaging versus proceeding to the OR for surgery.  Given the possible synovial cyst on the shoulder, would recommend doing this in the OR over an office procedure.  Patient is going to observe for now.  She will call the set up follow up with me as needed.

## 2025-02-21 ENCOUNTER — PATIENT MESSAGE (OUTPATIENT)
Dept: ADMINISTRATIVE | Facility: HOSPITAL | Age: 65
End: 2025-02-21
Payer: MEDICARE

## 2025-02-22 DIAGNOSIS — Z12.11 SCREENING FOR COLON CANCER: ICD-10-CM

## 2025-03-25 ENCOUNTER — PATIENT MESSAGE (OUTPATIENT)
Dept: ADMINISTRATIVE | Facility: HOSPITAL | Age: 65
End: 2025-03-25
Payer: MEDICARE

## 2025-06-02 ENCOUNTER — OFFICE VISIT (OUTPATIENT)
Dept: PODIATRY | Facility: CLINIC | Age: 65
End: 2025-06-02
Payer: MEDICARE

## 2025-06-02 ENCOUNTER — HOSPITAL ENCOUNTER (OUTPATIENT)
Dept: RADIOLOGY | Facility: HOSPITAL | Age: 65
Discharge: HOME | End: 2025-06-02
Attending: PODIATRIST
Payer: MEDICARE

## 2025-06-02 VITALS — WEIGHT: 207 LBS | HEIGHT: 61 IN | BODY MASS INDEX: 39.08 KG/M2

## 2025-06-02 DIAGNOSIS — M79.672 FOOT PAIN, LEFT: ICD-10-CM

## 2025-06-02 DIAGNOSIS — M79.672 FOOT PAIN, LEFT: Primary | ICD-10-CM

## 2025-06-02 PROCEDURE — 3008F BODY MASS INDEX DOCD: CPT | Mod: CPTII,S$GLB,, | Performed by: PODIATRIST

## 2025-06-02 PROCEDURE — 3288F FALL RISK ASSESSMENT DOCD: CPT | Mod: CPTII,S$GLB,, | Performed by: PODIATRIST

## 2025-06-02 PROCEDURE — 1159F MED LIST DOCD IN RCRD: CPT | Mod: CPTII,S$GLB,, | Performed by: PODIATRIST

## 2025-06-02 PROCEDURE — 99204 OFFICE O/P NEW MOD 45 MIN: CPT | Mod: S$GLB,,, | Performed by: PODIATRIST

## 2025-06-02 PROCEDURE — 1101F PT FALLS ASSESS-DOCD LE1/YR: CPT | Mod: CPTII,S$GLB,, | Performed by: PODIATRIST

## 2025-06-02 PROCEDURE — 73630 X-RAY EXAM OF FOOT: CPT | Mod: 26,LT,, | Performed by: RADIOLOGY

## 2025-06-02 PROCEDURE — 73630 X-RAY EXAM OF FOOT: CPT | Mod: TC,PO,LT

## 2025-06-02 PROCEDURE — 1126F AMNT PAIN NOTED NONE PRSNT: CPT | Mod: CPTII,S$GLB,, | Performed by: PODIATRIST

## 2025-06-02 PROCEDURE — 99999 PR PBB SHADOW E&M-EST. PATIENT-LVL III: CPT | Mod: PBBFAC,,, | Performed by: PODIATRIST

## 2025-06-04 ENCOUNTER — TELEPHONE (OUTPATIENT)
Dept: PODIATRY | Facility: CLINIC | Age: 65
End: 2025-06-04
Payer: MEDICARE

## 2025-06-14 ENCOUNTER — HOSPITAL ENCOUNTER (OUTPATIENT)
Dept: RADIOLOGY | Facility: HOSPITAL | Age: 65
Discharge: HOME | End: 2025-06-14
Attending: PODIATRIST
Payer: MEDICARE

## 2025-06-14 DIAGNOSIS — M79.672 FOOT PAIN, LEFT: ICD-10-CM

## 2025-06-14 PROCEDURE — 73718 MRI LOWER EXTREMITY W/O DYE: CPT | Mod: TC,PO,LT

## 2025-06-14 PROCEDURE — 73718 MRI LOWER EXTREMITY W/O DYE: CPT | Mod: 26,LT,, | Performed by: RADIOLOGY

## 2025-06-16 ENCOUNTER — RESULTS FOLLOW-UP (OUTPATIENT)
Dept: PODIATRY | Facility: CLINIC | Age: 65
End: 2025-06-16

## 2025-06-17 ENCOUNTER — OFFICE VISIT (OUTPATIENT)
Dept: FAMILY MEDICINE | Facility: CLINIC | Age: 65
End: 2025-06-17
Payer: MEDICARE

## 2025-06-17 VITALS — HEIGHT: 61 IN | WEIGHT: 211 LBS | OXYGEN SATURATION: 96 % | BODY MASS INDEX: 39.84 KG/M2 | HEART RATE: 80 BPM

## 2025-06-17 DIAGNOSIS — Z01.818 PRE-OP EVALUATION: Primary | ICD-10-CM

## 2025-06-17 PROCEDURE — 1159F MED LIST DOCD IN RCRD: CPT | Mod: CPTII,S$GLB,, | Performed by: FAMILY MEDICINE

## 2025-06-17 PROCEDURE — 1125F AMNT PAIN NOTED PAIN PRSNT: CPT | Mod: CPTII,S$GLB,, | Performed by: FAMILY MEDICINE

## 2025-06-17 PROCEDURE — 99213 OFFICE O/P EST LOW 20 MIN: CPT | Mod: S$GLB,,, | Performed by: FAMILY MEDICINE

## 2025-06-17 PROCEDURE — 3288F FALL RISK ASSESSMENT DOCD: CPT | Mod: CPTII,S$GLB,, | Performed by: FAMILY MEDICINE

## 2025-06-17 PROCEDURE — 1101F PT FALLS ASSESS-DOCD LE1/YR: CPT | Mod: CPTII,S$GLB,, | Performed by: FAMILY MEDICINE

## 2025-06-17 PROCEDURE — 1160F RVW MEDS BY RX/DR IN RCRD: CPT | Mod: CPTII,S$GLB,, | Performed by: FAMILY MEDICINE

## 2025-06-17 PROCEDURE — 99999 PR PBB SHADOW E&M-EST. PATIENT-LVL III: CPT | Mod: PBBFAC,,, | Performed by: FAMILY MEDICINE

## 2025-06-17 PROCEDURE — 3008F BODY MASS INDEX DOCD: CPT | Mod: CPTII,S$GLB,, | Performed by: FAMILY MEDICINE

## 2025-06-17 NOTE — PROGRESS NOTES
"Subjective:       Patient ID: Yolande Scherer is a 65 y.o. female.    Chief Complaint: Pre-op Exam    Pt is known to me.   She  has upcoming foot surgery per Dr. Alejandre.  She reports that she is having MAC and a "block."  She has had no issues with anesthesia in the past.  She is doing well other than her foot pain.  She has excellent BP control.   She has no cardiac nor pulmonary diagnoses.  Discussed health maintenance with pt and will schedule appropriate studies and/or immunizations.        Review of Systems    Objective:      Physical Exam  Vitals and nursing note reviewed.   Constitutional:       Appearance: She is well-developed.   HENT:      Head: Normocephalic.   Eyes:      Conjunctiva/sclera: Conjunctivae normal.      Pupils: Pupils are equal, round, and reactive to light.   Neck:      Thyroid: No thyromegaly.      Vascular: No carotid bruit.   Cardiovascular:      Rate and Rhythm: Normal rate and regular rhythm.      Heart sounds: Normal heart sounds.   Pulmonary:      Effort: Pulmonary effort is normal.      Breath sounds: Normal breath sounds.   Abdominal:      General: Bowel sounds are normal.      Palpations: Abdomen is soft.      Tenderness: There is no abdominal tenderness.   Musculoskeletal:         General: No tenderness or deformity. Normal range of motion.      Cervical back: Normal range of motion and neck supple.      Right lower leg: No edema.      Left lower leg: No edema.   Lymphadenopathy:      Cervical: No cervical adenopathy.   Skin:     General: Skin is warm and dry.   Neurological:      Mental Status: She is alert and oriented to person, place, and time.      Cranial Nerves: No cranial nerve deficit.      Motor: No abnormal muscle tone.      Coordination: Coordination normal.      Deep Tendon Reflexes: Reflexes normal.   Psychiatric:         Behavior: Behavior normal.         Assessment:       1. Pre-op evaluation        Plan:       Yolande was seen today for pre-op " exam.    Diagnoses and all orders for this visit:    Pre-op evaluation  Comments:  Pt is cleared for her upcoming foot surgery.      During this visit, I reviewed the pt's history, medications, allergies, and problem list.         Olanzapine Pregnancy And Lactation Text: This medication is pregnancy category C.   There are no adequate and well controlled trials with olanzapine in pregnant females.  Olanzapine should be used during pregnancy only if the potential benefit justifies the potential risk to the fetus.   In a study in lactating healthy women, olanzapine was excreted in breast milk.  It is recommended that women taking olanzapine should not breast feed.

## 2025-06-18 ENCOUNTER — PATIENT MESSAGE (OUTPATIENT)
Dept: FAMILY MEDICINE | Facility: CLINIC | Age: 65
End: 2025-06-18
Payer: MEDICARE

## 2025-06-18 DIAGNOSIS — Z12.31 ENCOUNTER FOR SCREENING MAMMOGRAM FOR MALIGNANT NEOPLASM OF BREAST: Primary | ICD-10-CM

## 2025-06-18 DIAGNOSIS — M79.672 FOOT PAIN, LEFT: Primary | ICD-10-CM

## 2025-06-18 DIAGNOSIS — M67.472 GANGLION CYST OF LEFT FOOT: ICD-10-CM

## 2025-06-19 ENCOUNTER — TELEPHONE (OUTPATIENT)
Dept: PODIATRY | Facility: CLINIC | Age: 65
End: 2025-06-19
Payer: MEDICARE

## 2025-06-26 ENCOUNTER — HOSPITAL ENCOUNTER (OUTPATIENT)
Dept: RADIOLOGY | Facility: HOSPITAL | Age: 65
Discharge: HOME | End: 2025-06-26
Attending: FAMILY MEDICINE
Payer: MEDICARE

## 2025-06-26 DIAGNOSIS — Z12.31 ENCOUNTER FOR SCREENING MAMMOGRAM FOR MALIGNANT NEOPLASM OF BREAST: ICD-10-CM

## 2025-06-26 PROCEDURE — 77063 BREAST TOMOSYNTHESIS BI: CPT | Mod: 26,,, | Performed by: RADIOLOGY

## 2025-06-26 PROCEDURE — 77067 SCR MAMMO BI INCL CAD: CPT | Mod: 26,,, | Performed by: RADIOLOGY

## 2025-06-26 PROCEDURE — 77063 BREAST TOMOSYNTHESIS BI: CPT | Mod: TC,PO

## 2025-07-09 ENCOUNTER — TELEPHONE (OUTPATIENT)
Dept: PODIATRY | Facility: CLINIC | Age: 65
End: 2025-07-09

## 2025-07-09 ENCOUNTER — OFFICE VISIT (OUTPATIENT)
Dept: PODIATRY | Facility: CLINIC | Age: 65
End: 2025-07-09
Payer: MEDICARE

## 2025-07-09 VITALS — WEIGHT: 210.13 LBS | BODY MASS INDEX: 39.67 KG/M2 | HEIGHT: 61 IN

## 2025-07-09 DIAGNOSIS — G89.18 POSTOPERATIVE PAIN: Primary | ICD-10-CM

## 2025-07-09 PROCEDURE — 3288F FALL RISK ASSESSMENT DOCD: CPT | Mod: CPTII,S$GLB,, | Performed by: PODIATRIST

## 2025-07-09 PROCEDURE — 1159F MED LIST DOCD IN RCRD: CPT | Mod: CPTII,S$GLB,, | Performed by: PODIATRIST

## 2025-07-09 PROCEDURE — 1101F PT FALLS ASSESS-DOCD LE1/YR: CPT | Mod: CPTII,S$GLB,, | Performed by: PODIATRIST

## 2025-07-09 PROCEDURE — 3044F HG A1C LEVEL LT 7.0%: CPT | Mod: CPTII,S$GLB,, | Performed by: PODIATRIST

## 2025-07-09 PROCEDURE — 99024 POSTOP FOLLOW-UP VISIT: CPT | Mod: S$GLB,,, | Performed by: PODIATRIST

## 2025-07-09 PROCEDURE — 99999 PR PBB SHADOW E&M-EST. PATIENT-LVL II: CPT | Mod: PBBFAC,,, | Performed by: PODIATRIST

## 2025-07-09 PROCEDURE — 1125F AMNT PAIN NOTED PAIN PRSNT: CPT | Mod: CPTII,S$GLB,, | Performed by: PODIATRIST

## 2025-07-09 RX ORDER — GABAPENTIN 300 MG/1
300 CAPSULE ORAL DAILY
Qty: 30 CAPSULE | Refills: 0 | Status: SHIPPED | OUTPATIENT
Start: 2025-07-09 | End: 2025-08-08

## 2025-07-09 NOTE — PROGRESS NOTES
Subjective:     Patient ID: Yolande Scherer is a 65 y.o. female.    Chief Complaint: Post-op Evaluation  Patient presents to clinic 1 week S/P cyst excision of the Lt. Foot.  Notes having significant postoperative pain, several days, after the procedure.  States pain medication helped initially, however, the burning pain is not being well controlled.  Rates pain as an 8/10.  She has been elevating the limb and minimizing weight bearing.  Notes removing the bandage, for fear there was an issue with the incision.  She has been applying mupirocin to the site QD and covering with gauze.  Denies experiencing N/V/F/C/D.  Denies chest pain, SOB, and calf/thigh pain.  Denies any additional pedal complaints.      Past Medical History:   Diagnosis Date    Dry eyes 12/09/2013    Dry mouth 01/17/2014    Fibromyalgia 12/09/2013    History of colonic polyps 2002    colonoscopy per pt    History of vitamin D deficiency     Liver cyst     Migraine     NAFLD (nonalcoholic fatty liver disease)     Vertigo        Past Surgical History:   Procedure Laterality Date    CHOLECYSTECTOMY      COLONOSCOPY      EXCISION OF SOFT TISSUE Left 7/1/2025    Procedure: EXCISION, SOFT TISSUE-FOOT;  Surgeon: Rios Alejandre DPM;  Location: [facility] OR;  Service: Podiatry;  Laterality: Left;    TONSILLECTOMY      WISDOM TOOTH EXTRACTION         Family History   Problem Relation Name Age of Onset    Cataracts Mother      Macular degeneration Father      Cataracts Father      Stroke Father      Dementia Father      Breast cancer Cousin      Glaucoma Maternal Grandmother      Liver disease Neg Hx      Retinal detachment Neg Hx         Social History     Socioeconomic History    Marital status:    Tobacco Use    Smoking status: Never    Smokeless tobacco: Never   Substance and Sexual Activity    Alcohol use: No    Drug use: No    Sexual activity: Yes     Partners: Male     Birth control/protection: Post-menopausal     Social Drivers of Health      Financial Resource Strain: Low Risk  (1/11/2025)    Overall Financial Resource Strain (CARDIA)     Difficulty of Paying Living Expenses: Not hard at all   Food Insecurity: No Food Insecurity (1/11/2025)    Hunger Vital Sign     Worried About Running Out of Food in the Last Year: Never true     Ran Out of Food in the Last Year: Never true   Transportation Needs: Unknown (9/8/2021)    Received from [facility]    PRAPARE - Transportation     Lack of Transportation (Medical): Patient declined     Lack of Transportation (Non-Medical): Patient declined   Physical Activity: Sufficiently Active (1/11/2025)    Exercise Vital Sign     Days of Exercise per Week: 5 days     Minutes of Exercise per Session: 60 min   Stress: No Stress Concern Present (1/11/2025)    [employer] - Occupational Stress Questionnaire     Feeling of Stress : Not at all   Housing Stability: Unknown (1/11/2025)    Housing Stability Vital Sign     Unable to Pay for Housing in the Last Year: No       Current Medications[1]    Review of patient's allergies indicates:  No Known Allergies     Review of Systems   Constitutional: Negative for chills and fever.   Skin:  Negative for color change and nail changes.   Musculoskeletal:  Positive for myalgias. Negative for joint pain, muscle cramps and muscle weakness.   Gastrointestinal:  Negative for nausea and vomiting.   Neurological:  Negative for numbness and paresthesias.   Psychiatric/Behavioral:  Negative for altered mental status.         Objective:     Physical Exam  Constitutional:       Appearance: Normal appearance. She is not ill-appearing.   Cardiovascular:      Pulses:           Dorsalis pedis pulses are 2+ on the right side and 2+ on the left side.        Posterior tibial pulses are 2+ on the right side and 2+ on the left side.      Comments: CFT is < 3 seconds bilateral.  Pedal hair growth is present bilateral.  Moderate nonpitting edema of the Lt.  Forefoot and midfoot. Toes are warm to touch bilateral.    Musculoskeletal:         General: Swelling and tenderness present.      Right lower leg: No edema.      Left lower leg: Edema present.      Comments: Muscle strength 5/5 in all muscle groups bilateral.  No tenderness nor crepitation with ROM of foot/ankle joints bilateral.  Mild pain with palpation to the incision overlying the medial and intermediate cuneiforms of the Lt. Foot.    Skin:     Capillary Refill: Capillary refill takes 2 to 3 seconds.      Findings: No bruising, ecchymosis, erythema, signs of injury, laceration, lesion, petechiae, rash or wound.      Comments: Incision overlying the Lt. Dorsal midfoot remains well approximated with all suture intact.  No evidence of dehiscence, necrosis, ischemia, or infection the length of the incision.        Neurological:      General: No focal deficit present.      Mental Status: She is alert.      Sensory: No sensory deficit.      Motor: No weakness or atrophy.      Comments: Light touch is intact bilateral.             Assessment:      Encounter Diagnosis   Name Primary?    Postoperative pain Yes     Plan:     Yolande was seen today for post-op evaluation.    Diagnoses and all orders for this visit:    Postoperative pain  -     gabapentin (NEURONTIN) 300 MG capsule; Take 1 capsule (300 mg total) by mouth once daily.      I counseled the patient on her conditions, their implications and medical management.      Overall, satisfactory postoperative results noted.  Incision remains well approximated with all suture intact and no obvious sign of postoperative infection.     The incision was painted with betadine, and a modified football dressing was applied.     Instructed to keep today's dressing CDI x 1 week.     Advised to continue elevating the limb and minimizing weight bearing activity.    Rx written for gabapentin for postoperative paresthesias.       RTC in 1 week for follow up and likely removal of  sutures.     Rios Alejandre DPM          [1]   Current Outpatient Medications   Medication Sig Dispense Refill    estradioL (ESTRACE) 2 MG tablet Take 1 tablet (2 mg total) by mouth once daily. 90 tablet 4    gabapentin (NEURONTIN) 300 MG capsule Take 1 capsule (300 mg total) by mouth once daily. 30 capsule 0    HYDROcodone-acetaminophen (NORCO) 5-325 mg per tablet Take 1 tablet by mouth every 6 (six) hours as needed for Pain. 28 tablet 0    multivitamin (THERAGRAN) per tablet Take 1 tablet by mouth once daily. (Patient not taking: Reported on 6/25/2025)      mupirocin (BACTROBAN) 2 % ointment Apply topically 3 (three) times daily. Until healed (Patient not taking: Reported on 6/25/2025) 22 g 1    omeprazole (PRILOSEC) 40 MG capsule       progesterone (PROMETRIUM) 100 MG capsule Take 100 mg by mouth every evening.       No current facility-administered medications for this visit.

## 2025-07-09 NOTE — TELEPHONE ENCOUNTER
Copied from CRM #[id number]. Topic: Appointments - Appointment Access  >> Jul 9, 2025  9:52 AM Olamide wrote:  Type:  Sooner Apoointment Request    Caller is requesting a sooner appointment.  Caller declined first available appointment listed below.  Caller will not accept being placed on the waitlist and is requesting a message be sent to doctor.    Name of Caller:pt     When is the first available appointment?August     Symptoms:stitches being removed     Would the patient rather a call back or a response via [facility]? Call     Best Call Back Number:[phone]    Additional Information: pt is scheduled for the 16 and she needs to see if she can be seen a little later in the day that day. She has her mom and her mom has surgery that day at noon so she would like to try and be seen a little closer to that time so her mom doesn't have to be out all morning before her surgery.

## 2025-07-16 ENCOUNTER — OFFICE VISIT (OUTPATIENT)
Dept: PODIATRY | Facility: CLINIC | Age: 65
End: 2025-07-16
Payer: MEDICARE

## 2025-07-16 VITALS — BODY MASS INDEX: 40.91 KG/M2 | WEIGHT: 216.69 LBS | HEIGHT: 61 IN

## 2025-07-16 DIAGNOSIS — G89.18 POSTOPERATIVE PAIN: Primary | ICD-10-CM

## 2025-07-16 PROCEDURE — 99999 PR PBB SHADOW E&M-EST. PATIENT-LVL II: CPT | Mod: PBBFAC,,, | Performed by: PODIATRIST

## 2025-07-16 PROCEDURE — 3044F HG A1C LEVEL LT 7.0%: CPT | Mod: CPTII,S$GLB,, | Performed by: PODIATRIST

## 2025-07-16 PROCEDURE — 3288F FALL RISK ASSESSMENT DOCD: CPT | Mod: CPTII,S$GLB,, | Performed by: PODIATRIST

## 2025-07-16 PROCEDURE — 1159F MED LIST DOCD IN RCRD: CPT | Mod: CPTII,S$GLB,, | Performed by: PODIATRIST

## 2025-07-16 PROCEDURE — 1101F PT FALLS ASSESS-DOCD LE1/YR: CPT | Mod: CPTII,S$GLB,, | Performed by: PODIATRIST

## 2025-07-16 PROCEDURE — 99024 POSTOP FOLLOW-UP VISIT: CPT | Mod: S$GLB,,, | Performed by: PODIATRIST

## 2025-07-16 PROCEDURE — 1126F AMNT PAIN NOTED NONE PRSNT: CPT | Mod: CPTII,S$GLB,, | Performed by: PODIATRIST

## 2025-07-16 NOTE — PROGRESS NOTES
Subjective:     Patient ID: Yolande Scherer is a 65 y.o. female.    Chief Complaint: No chief complaint on file.  Patient presents to clinic 2 weeks S/P cyst excision of the Lt. Foot.  Denies pain to the limb with today's exam.  She is still having some residual paresthesias, however, notes this has been managed with taking gabapentin.  Relates removing the bandage on Sunday and has been allowing the site to air dry. Denies experiencing N/V/F/C/D.  Denies chest pain, SOB, and calf/thigh pain.  Denies any additional pedal complaints.      Past Medical History:   Diagnosis Date    Dry eyes 12/09/2013    Dry mouth 01/17/2014    Fibromyalgia 12/09/2013    History of colonic polyps 2002    colonoscopy per pt    History of vitamin D deficiency     Liver cyst     Migraine     NAFLD (nonalcoholic fatty liver disease)     Vertigo        Past Surgical History:   Procedure Laterality Date    CHOLECYSTECTOMY      COLONOSCOPY      EXCISION OF SOFT TISSUE Left 7/1/2025    Procedure: EXCISION, SOFT TISSUE-FOOT;  Surgeon: Rios Alejandre DPM;  Location: [facility] OR;  Service: Podiatry;  Laterality: Left;    TONSILLECTOMY      WISDOM TOOTH EXTRACTION         Family History   Problem Relation Name Age of Onset    Cataracts Mother      Macular degeneration Father      Cataracts Father      Stroke Father      Dementia Father      Breast cancer Cousin      Glaucoma Maternal Grandmother      Liver disease Neg Hx      Retinal detachment Neg Hx         Social History     Socioeconomic History    Marital status:    Tobacco Use    Smoking status: Never    Smokeless tobacco: Never   Substance and Sexual Activity    Alcohol use: No    Drug use: No    Sexual activity: Yes     Partners: Male     Birth control/protection: Post-menopausal     Social Drivers of Health     Financial Resource Strain: Low Risk  (1/11/2025)    Overall Financial Resource Strain (CARDIA)     Difficulty of Paying Living Expenses: Not hard at all   Food  Insecurity: No Food Insecurity (1/11/2025)    Hunger Vital Sign     Worried About Running Out of Food in the Last Year: Never true     Ran Out of Food in the Last Year: Never true   Transportation Needs: Unknown (9/8/2021)    Received from [facility]    PRAPARE - Transportation     Lack of Transportation (Medical): Patient declined     Lack of Transportation (Non-Medical): Patient declined   Physical Activity: Sufficiently Active (1/11/2025)    Exercise Vital Sign     Days of Exercise per Week: 5 days     Minutes of Exercise per Session: 60 min   Stress: No Stress Concern Present (1/11/2025)    [employer] - Occupational Stress Questionnaire     Feeling of Stress : Not at all   Housing Stability: Unknown (1/11/2025)    Housing Stability Vital Sign     Unable to Pay for Housing in the Last Year: No       Current Medications[1]    Review of patient's allergies indicates:  No Known Allergies     Review of Systems   Constitutional: Negative for chills and fever.   Skin:  Negative for color change and nail changes.   Musculoskeletal:  Positive for myalgias. Negative for joint pain, muscle cramps and muscle weakness.   Gastrointestinal:  Negative for nausea and vomiting.   Neurological:  Negative for numbness and paresthesias.   Psychiatric/Behavioral:  Negative for altered mental status.         Objective:     Physical Exam  Constitutional:       Appearance: Normal appearance. She is not ill-appearing.   Cardiovascular:      Pulses:           Dorsalis pedis pulses are 2+ on the right side and 2+ on the left side.        Posterior tibial pulses are 2+ on the right side and 2+ on the left side.      Comments: CFT is < 3 seconds bilateral.  Pedal hair growth is present bilateral.  Moderate nonpitting edema of the Lt. Forefoot and midfoot. Toes are warm to touch bilateral.    Musculoskeletal:         General: Swelling and tenderness present.      Right lower leg: No edema.      Left  lower leg: Edema present.      Comments: Muscle strength 5/5 in all muscle groups bilateral.  No tenderness nor crepitation with ROM of foot/ankle joints bilateral.  (-) for pain with palpation to the incision overlying the medial and intermediate cuneiforms of the Lt. Foot.    Skin:     Capillary Refill: Capillary refill takes 2 to 3 seconds.      Findings: No bruising, ecchymosis, erythema, signs of injury, laceration, lesion, petechiae, rash or wound.      Comments: Incision overlying the Lt. Dorsal midfoot remains well approximated with all suture intact.  No evidence of dehiscence, necrosis, ischemia, or infection the length of the incision.        Neurological:      General: No focal deficit present.      Mental Status: She is alert.      Sensory: No sensory deficit.      Motor: No weakness or atrophy.      Comments: Light touch is intact bilateral.             Assessment:      Encounter Diagnosis   Name Primary?    Postoperative pain Yes     Plan:     Diagnoses and all orders for this visit:    Postoperative pain      I counseled the patient on her conditions, their implications and medical management.      Overall, satisfactory postoperative results noted.  Incision remains well approximated with all suture intact and no obvious sign of postoperative infection.     With the patient's verbal consent, a sterile suture removal kit was used to remove stitches without incident.  Patient tolerated this quite well.     The incision line was covered with triple antibiotic ointment and a band aid.  To apply mupirocin to the site QD x 3 final days.  May apply a padded bandage to protect the new skin with wearing casual shoe gear.    May resume use of casual shoe gear with activity to tolerance.    May resume her normal showering routine but discouraged from soaking/scrubbing the limb x 2 weeks.      Being that she is having residual paresthesias from the procedure, advised to take gabapentin prn.     RTC prn.      Rios Alejandre DPM            [1]   Current Outpatient Medications   Medication Sig Dispense Refill    estradioL (ESTRACE) 2 MG tablet Take 1 tablet (2 mg total) by mouth once daily. 90 tablet 4    gabapentin (NEURONTIN) 300 MG capsule Take 1 capsule (300 mg total) by mouth once daily. 30 capsule 0    HYDROcodone-acetaminophen (NORCO) 5-325 mg per tablet Take 1 tablet by mouth every 6 (six) hours as needed for Pain. 28 tablet 0    multivitamin (THERAGRAN) per tablet Take 1 tablet by mouth once daily. (Patient not taking: Reported on 6/25/2025)      mupirocin (BACTROBAN) 2 % ointment Apply topically 3 (three) times daily. Until healed (Patient not taking: Reported on 6/25/2025) 22 g 1    omeprazole (PRILOSEC) 40 MG capsule       progesterone (PROMETRIUM) 100 MG capsule Take 100 mg by mouth every evening.       No current facility-administered medications for this visit.

## 2025-08-26 ENCOUNTER — TELEPHONE (OUTPATIENT)
Dept: FAMILY MEDICINE | Facility: CLINIC | Age: 65
End: 2025-08-26
Payer: MEDICARE

## 2025-08-26 DIAGNOSIS — Z86.018 HISTORY OF BENIGN NEOPLASM OF COLON: Primary | ICD-10-CM

## 2025-08-26 DIAGNOSIS — D49.0 NEOPLASM OF UNSPECIFIED BEHAVIOR OF DIGESTIVE SYSTEM: ICD-10-CM

## 2025-08-28 ENCOUNTER — TELEPHONE (OUTPATIENT)
Dept: FAMILY MEDICINE | Facility: CLINIC | Age: 65
End: 2025-08-28
Payer: MEDICARE

## 2025-09-03 ENCOUNTER — TELEPHONE (OUTPATIENT)
Dept: FAMILY MEDICINE | Facility: CLINIC | Age: 65
End: 2025-09-03
Payer: MEDICARE

## (undated) DEVICE — MANIFOLD 4 PORT

## (undated) DEVICE — GLOVE 7.0 PROTEXIS PI BLUE

## (undated) DEVICE — DEVICE ABLATION NOVASURE DISP

## (undated) DEVICE — SEE L#152161

## (undated) DEVICE — GLOVE PROTEXIS HYDROGEL SZ6.5

## (undated) DEVICE — PACK DRAPE PERI/GYN TIBURON

## (undated) DEVICE — ELECTRODE REM PLYHSV RETURN 9

## (undated) DEVICE — SEAL LENS SCOPE MYOSURE

## (undated) DEVICE — SEE MEDLINE ITEM 157117

## (undated) DEVICE — SOL IRR NACL .9% 3000ML

## (undated) DEVICE — CATH URETHRAL 18FR

## (undated) DEVICE — SEE MEDLINE ITEM 157181

## (undated) DEVICE — SEE MEDLINE ITEM 154981

## (undated) DEVICE — DRESSING TELFA N ADH 3X8

## (undated) DEVICE — SEE MEDLINE ITEM 157027

## (undated) DEVICE — SET CYSTO IRRIGATION UNIV SPIK

## (undated) DEVICE — CONTAINER SPECIMEN STRL 4OZ

## (undated) DEVICE — SEE MEDLINE ITEM 152622